# Patient Record
Sex: MALE | URBAN - METROPOLITAN AREA
[De-identification: names, ages, dates, MRNs, and addresses within clinical notes are randomized per-mention and may not be internally consistent; named-entity substitution may affect disease eponyms.]

---
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
## 2024-08-26 ENCOUNTER — HOSPITAL ENCOUNTER (EMERGENCY)
Facility: HOSPITAL | Age: 24
Discharge: HOME | End: 2024-08-26
Attending: EMERGENCY MEDICINE
Payer: COMMERCIAL

## 2024-08-26 VITALS
HEART RATE: 89 BPM | SYSTOLIC BLOOD PRESSURE: 127 MMHG | TEMPERATURE: 98.5 F | OXYGEN SATURATION: 99 % | WEIGHT: 132.72 LBS | DIASTOLIC BLOOD PRESSURE: 63 MMHG | RESPIRATION RATE: 16 BRPM

## 2024-08-26 DIAGNOSIS — L73.9 FOLLICULITIS: Primary | ICD-10-CM

## 2024-08-26 PROCEDURE — 99282 EMERGENCY DEPT VISIT SF MDM: CPT

## 2024-08-26 PROCEDURE — 99284 EMERGENCY DEPT VISIT MOD MDM: CPT | Performed by: EMERGENCY MEDICINE

## 2024-08-26 RX ORDER — CEPHALEXIN 500 MG/1
500 CAPSULE ORAL EVERY 6 HOURS SCHEDULED
Qty: 20 CAPSULE | Refills: 0 | Status: SHIPPED | OUTPATIENT
Start: 2024-08-26 | End: 2024-08-31

## 2024-08-26 NOTE — ED PROVIDER NOTES
History  Chief Complaint   Patient presents with    Cyst     Pt reports cyst below RLQ but above groin beginning two days ago. Denies fevers. Reports pain.      24 YOM presents today with small cyst in his left inguinal region.  Patient reports that it has been there for about 2 days.  Admits to some pain.  No drainage.        None       History reviewed. No pertinent past medical history.    History reviewed. No pertinent surgical history.    History reviewed. No pertinent family history.  I have reviewed and agree with the history as documented.    E-Cigarette/Vaping     E-Cigarette/Vaping Substances     Social History     Tobacco Use    Smoking status: Never   Substance Use Topics    Alcohol use: Yes     Comment: occasionally    Drug use: Yes     Types: Marijuana       Review of Systems   Skin:         Abscess     All other systems reviewed and are negative.      Physical Exam  Physical Exam  Vitals and nursing note reviewed.   Constitutional:       General: He is not in acute distress.     Appearance: Normal appearance. He is well-developed.   HENT:      Head: Normocephalic and atraumatic.   Eyes:      Conjunctiva/sclera: Conjunctivae normal.   Cardiovascular:      Rate and Rhythm: Normal rate.   Pulmonary:      Effort: Pulmonary effort is normal.   Genitourinary:      Musculoskeletal:         General: No swelling. Normal range of motion.      Cervical back: Normal range of motion and neck supple.   Skin:     General: Skin is warm and dry.   Neurological:      Mental Status: He is alert.   Psychiatric:         Mood and Affect: Mood normal.         Behavior: Behavior normal.         Vital Signs  ED Triage Vitals [08/26/24 1256]   Temperature Pulse Respirations Blood Pressure SpO2   98.5 °F (36.9 °C) 89 16 127/63 99 %      Temp Source Heart Rate Source Patient Position - Orthostatic VS BP Location FiO2 (%)   Oral Monitor -- -- --      Pain Score       --           Vitals:    08/26/24 1256   BP: 127/63   Pulse: 89          Visual Acuity      ED Medications  Medications - No data to display    Diagnostic Studies  Results Reviewed       None                   No orders to display              Procedures  Procedures         ED Course                                               Medical Decision Making  Patient does admit to shaving in the area where the pain is.  I do not feel a definitive abscess or drainable area.  Will treat with Keflex.  Discussed warm compresses at home.  Avoid shaving in the area.    I have discussed the plan to discharge pt from ED. The patient was discharged in stable condition.  Patient ambulated off the department.  Extensive return to emergency department precautions were discussed.  Follow up with appropriate providers including primary care physician was discussed.  Patient and/or their  primary decision maker expressed understanding.  Patient remained stable during entire emergency department stay.      Risk  Prescription drug management.                 Disposition  Final diagnoses:   Folliculitis     Time reflects when diagnosis was documented in both MDM as applicable and the Disposition within this note       Time User Action Codes Description Comment    8/26/2024  1:09 PM Marino Banegas Add [L73.9] Folliculitis           ED Disposition       ED Disposition   Discharge    Condition   Stable    Date/Time   Mon Aug 26, 2024  1:09 PM    Comment   Shin Lam discharge to home/self care.                   Follow-up Information    None         Discharge Medication List as of 8/26/2024  1:10 PM        START taking these medications    Details   cephalexin (KEFLEX) 500 mg capsule Take 1 capsule (500 mg total) by mouth every 6 (six) hours for 5 days, Starting Mon 8/26/2024, Until Sat 8/31/2024, Normal             No discharge procedures on file.    PDMP Review       None            ED Provider  Electronically Signed by             Marino Banegas PA-C  08/26/24 9338

## 2024-10-19 ENCOUNTER — HOSPITAL ENCOUNTER (EMERGENCY)
Facility: HOSPITAL | Age: 24
Discharge: HOME | End: 2024-10-19
Attending: EMERGENCY MEDICINE
Payer: COMMERCIAL

## 2024-10-19 VITALS
WEIGHT: 128.09 LBS | SYSTOLIC BLOOD PRESSURE: 128 MMHG | OXYGEN SATURATION: 100 % | RESPIRATION RATE: 16 BRPM | HEART RATE: 87 BPM | DIASTOLIC BLOOD PRESSURE: 78 MMHG | TEMPERATURE: 99.7 F

## 2024-10-19 DIAGNOSIS — K08.89 PAIN, DENTAL: Primary | ICD-10-CM

## 2024-10-19 PROCEDURE — 96372 THER/PROPH/DIAG INJ SC/IM: CPT

## 2024-10-19 PROCEDURE — 99282 EMERGENCY DEPT VISIT SF MDM: CPT

## 2024-10-19 PROCEDURE — 99284 EMERGENCY DEPT VISIT MOD MDM: CPT

## 2024-10-19 RX ORDER — KETOROLAC TROMETHAMINE 30 MG/ML
15 INJECTION, SOLUTION INTRAMUSCULAR; INTRAVENOUS ONCE
Status: COMPLETED | OUTPATIENT
Start: 2024-10-19 | End: 2024-10-19

## 2024-10-19 RX ORDER — DIPHENHYDRAMINE HYDROCHLORIDE AND LIDOCAINE HYDROCHLORIDE AND ALUMINUM HYDROXIDE AND MAGNESIUM HYDRO
10 KIT ONCE
Status: COMPLETED | OUTPATIENT
Start: 2024-10-19 | End: 2024-10-19

## 2024-10-19 RX ADMIN — KETOROLAC TROMETHAMINE 15 MG: 30 INJECTION, SOLUTION INTRAMUSCULAR; INTRAVENOUS at 14:56

## 2024-10-19 RX ADMIN — DIPHENHYDRAMINE HYDROCHLORIDE AND LIDOCAINE HYDROCHLORIDE AND ALUMINUM HYDROXIDE AND MAGNESIUM HYDRO 10 ML: KIT at 14:59

## 2024-10-19 NOTE — ED PROVIDER NOTES
Time reflects when diagnosis was documented in both MDM as applicable and the Disposition within this note       Time User Action Codes Description Comment    10/19/2024  2:42 PM Zi Allen Add [K08.89] Pain, dental           ED Disposition       ED Disposition   Discharge    Condition   Stable    Date/Time   Sat Oct 19, 2024  2:48 PM    Comment   Shin Lam discharge to home/self care.                   Assessment & Plan       Medical Decision Making  24-year-old male presents to the ED for evaluation of ongoing dental pain X approximately 3 weeks.  Patient afebrile normal vital signs in the ED, well-appearing on exam.  On exam, no signs of trismus, drooling, no signs of any obviously drainable abscess in patient's mouth, no signs of airway swelling or compromise.  Patient given IM Toradol and Magic mouthwash in ED.  Given prescription for Augmentin.  Ambulatory referral to [place] [facility] placed.  I did emphasize the importance of follow-up with professional dental services for definitive care.  ED return precautions discussed with patient.  Patient verbalized understanding and agreement with plan.    Risk  Prescription drug management.             Medications       ED Risk Strat Scores                           SBIRT 22yo+      Flowsheet Row Most Recent Value   Initial Alcohol Screen: US AUDIT-C     1. How often do you have a drink containing alcohol? 0 Filed at: 10/19/2024 1506   2. How many drinks containing alcohol do you have on a typical day you are drinking?  0 Filed at: 10/19/2024 1506   3a. Male UNDER 65: How often do you have five or more drinks on one occasion? 0 Filed at: 10/19/2024 1506   3b. FEMALE Any Age, or MALE 65+: How often do you have 4 or more drinks on one occassion? 0 Filed at: 10/19/2024 1506   Audit-C Score 0 Filed at: 10/19/2024 1506   NYDIA: How many times in the past year have you...    Used an illegal drug or used a prescription medication for non-medical reasons? Never  Filed at: 10/19/2024 1506                            History of Present Illness       Chief Complaint   Patient presents with    Dental Pain     Pt c/o left loer dental pain x3 weeks reports has to get tooth extracted. Denies fevers       Past Medical History:   Diagnosis Date    Complete blindness of right eye       History reviewed. No pertinent surgical history.   History reviewed. No pertinent family history.   Social History     Tobacco Use    Smoking status: Never   Substance Use Topics    Alcohol use: Yes     Comment: occasionally    Drug use: Yes     Types: Marijuana      E-Cigarette/Vaping      E-Cigarette/Vaping Substances      I have reviewed and agree with the history as documented.     This is a 24-year-old male presents ED for evaluation of dental pain X approximately 3 weeks.  Patient reports left lower dental pain that has been ongoing.  Denies any injury to his mouth or jaw.  States pain does radiate to his left ear, otherwise nonradiating.  Denies any trismus, drooling.  Does report mildly increased pain with chewing but states he is not having issues tolerating p.o.  He reports he has been having issues following up with a dentist.  Reports that he has tried multiple dentists and either they do not return his calls or they do not accept his insurance.  He denies any other acute concerns or complaints at this time including fevers, chills, headaches, vision changes, neck pain, chest pain, SOB, abdominal pain, NVD, dysuria.  States he last took Tylenol yesterday for the pain.      Dental Pain  Associated symptoms: no fever, no headaches and no neck pain        Review of Systems   Constitutional:  Negative for chills and fever.   HENT:  Positive for dental problem. Negative for trouble swallowing.    Eyes:  Negative for photophobia and visual disturbance.   Respiratory:  Negative for cough and shortness of breath.    Cardiovascular:  Negative for chest pain and palpitations.   Gastrointestinal:   Negative for abdominal pain, diarrhea, nausea and vomiting.   Genitourinary:  Negative for difficulty urinating, dysuria and hematuria.   Musculoskeletal:  Negative for neck pain and neck stiffness.   Neurological:  Negative for dizziness, syncope, light-headedness and headaches.           Objective       ED Triage Vitals   Temperature Pulse Blood Pressure Respirations SpO2 Patient Position - Orthostatic VS   10/19/24 1326 10/19/24 1328 10/19/24 1326 10/19/24 1326 10/19/24 1326 10/19/24 1326   99.7 °F (37.6 °C) 87 128/78 16 100 % Sitting      Temp Source Heart Rate Source BP Location FiO2 (%) Pain Score    10/19/24 1326 10/19/24 1326 10/19/24 1326 -- 10/19/24 1456    Oral Monitor Right arm  10 - Worst Possible Pain      Vitals      Date and Time Temp Pulse SpO2 Resp BP Pain Score FACES Pain Rating User   10/19/24 1456 -- -- -- -- -- 10 - Worst Possible Pain -- ML   10/19/24 1328 -- 87 -- -- -- -- -- DIC   10/19/24 1326 99.7 °F (37.6 °C) -- 100 % 16 128/78 -- -- DIC            Physical Exam  Vitals and nursing note reviewed.   Constitutional:       General: He is not in acute distress.     Appearance: Normal appearance. He is well-developed. He is not ill-appearing, toxic-appearing or diaphoretic.   HENT:      Head: Normocephalic and atraumatic.      Right Ear: Tympanic membrane, ear canal and external ear normal.      Left Ear: Tympanic membrane, ear canal and external ear normal.      Mouth/Throat:        Comments: No signs of bleeding or drainage.  No signs of any obviously drainable abscess.  Uvula sits midline.  No signs of submental or submandibular swelling.  No signs of tonsillar swelling or tonsillar exudates.  No signs of acute airway swelling or compromise.  Eyes:      Conjunctiva/sclera: Conjunctivae normal.   Cardiovascular:      Rate and Rhythm: Normal rate and regular rhythm.      Heart sounds: No murmur heard.  Pulmonary:      Effort: Pulmonary effort is normal. No respiratory distress.      Breath  sounds: Normal breath sounds.   Abdominal:      General: There is no distension.      Palpations: Abdomen is soft.      Tenderness: There is no abdominal tenderness. There is no right CVA tenderness, left CVA tenderness or guarding.   Musculoskeletal:         General: No swelling.      Cervical back: Normal range of motion and neck supple. No rigidity.   Skin:     General: Skin is warm and dry.      Capillary Refill: Capillary refill takes less than 2 seconds.   Neurological:      General: No focal deficit present.      Mental Status: He is alert and oriented to person, place, and time.   Psychiatric:         Mood and Affect: Mood normal.         Results Reviewed       None            No orders to display       Procedures    ED Medication and Procedure Management   None     Discharge Medication List as of 10/19/2024  2:48 PM        START taking these medications    Details   amoxicillin-clavulanate (AUGMENTIN) 875-125 mg per tablet Take 1 tablet by mouth every 12 (twelve) hours for 7 days, Starting Sat 10/19/2024, Until Sat 10/26/2024, Normal             ED SEPSIS DOCUMENTATION   Time reflects when diagnosis was documented in both MDM as applicable and the Disposition within this note       Time User Action Codes Description Comment    10/19/2024  2:42 PM Zi Allen Add [K08.89] Pain, dental                  Zi Allen PA-C  10/19/24 3393

## 2024-10-24 ENCOUNTER — OFFICE VISIT (OUTPATIENT)
Dept: DENTISTRY | Facility: CLINIC | Age: 24
End: 2024-10-24

## 2024-10-24 VITALS — SYSTOLIC BLOOD PRESSURE: 114 MMHG | HEART RATE: 67 BPM | TEMPERATURE: 98.7 F | DIASTOLIC BLOOD PRESSURE: 69 MMHG

## 2024-10-24 DIAGNOSIS — K04.7 DENTAL INFECTION: Primary | ICD-10-CM

## 2024-10-24 PROCEDURE — D0330 PANORAMIC RADIOGRAPHIC IMAGE: HCPCS

## 2024-10-24 PROCEDURE — D0220 INTRAORAL - PERIAPICAL FIRST RADIOGRAPHIC IMAGE: HCPCS

## 2024-10-24 PROCEDURE — D0140 LIMITED ORAL EVALUATION - PROBLEM FOCUSED: HCPCS

## 2024-10-24 NOTE — DENTAL PROCEDURE DETAILS
"Limited Exam    Shin Lam 24 y.o. male presents with self to [facility] for Limited exam  PMH reviewed, no changes, ASA I.    Chief complaint:  \"I went to the ER because I was in pain and its keeping me up at night. They gave me antibiotics, I am on day 5.\"    Consent:  Discussed that limited exam focuses on problem area, and same day tx is not guaranteed.  Patient explained to if they wish to have anything else evaluated, they need to return to the practice at which they are a patient of record or schedule a comprehensive exam afterwards.  Patient understands and consent was given by self via verbal consent.    Subjective history:    Onset: 1 week ago.   Provocation: Cold, Hot, Biting, Chewing.   Quality: Sharp, Shooting, \"Electric-shock\".   Region: UL, LL.   Severity: 10/10.   Timing: constant, keeps patient up at night.    Objective clinical findings:   Oral cancer screening: normal.   Extraoral exam: no remarkable findings.  Intraoral exam: no remarkable findings.     Radiographs: PAN and Select PA(s) - #17,18,19 .     Assessment:  Caries on #16  Non-restorable caries on #19 and 30  Caries on #18-MO    Plan:   Limited intra oral and extra oral exam completed. Explained to patient he does need to get 1,16,19,and 30 removed. In addition, informed patient he has caries on #18 and recommend he return for comp exam to determine full treatment plan.    Referral(s): [facility] for 1,16,19,30 .  Rx: None.  Comprehensive care disposition: Patient denies being a patient of record anywhere and was encouraged to establish comprehensive dental care somewhere, whether it be at one of the  dental clinics or another practice of choice.    Patient dismissed ambulatory and alert.    NV: comp exam.    Attending: Dr. Eid checked radiographs .   "

## 2024-10-30 ENCOUNTER — TELEPHONE (OUTPATIENT)
Dept: DENTISTRY | Facility: CLINIC | Age: 24
End: 2024-10-30

## 2024-11-25 ENCOUNTER — HOSPITAL ENCOUNTER (EMERGENCY)
Facility: HOSPITAL | Age: 24
Discharge: HOME | End: 2024-11-25
Attending: EMERGENCY MEDICINE
Payer: COMMERCIAL

## 2024-11-25 VITALS
HEART RATE: 90 BPM | RESPIRATION RATE: 18 BRPM | WEIGHT: 127.87 LBS | TEMPERATURE: 98.5 F | SYSTOLIC BLOOD PRESSURE: 141 MMHG | DIASTOLIC BLOOD PRESSURE: 71 MMHG | OXYGEN SATURATION: 100 %

## 2024-11-25 DIAGNOSIS — K08.89 DENTALGIA: ICD-10-CM

## 2024-11-25 DIAGNOSIS — K04.7 DENTAL ABSCESS: Primary | ICD-10-CM

## 2024-11-25 PROCEDURE — 99284 EMERGENCY DEPT VISIT MOD MDM: CPT

## 2024-11-25 PROCEDURE — 96372 THER/PROPH/DIAG INJ SC/IM: CPT

## 2024-11-25 PROCEDURE — 99282 EMERGENCY DEPT VISIT SF MDM: CPT

## 2024-11-25 RX ORDER — KETOROLAC TROMETHAMINE 30 MG/ML
15 INJECTION, SOLUTION INTRAMUSCULAR; INTRAVENOUS ONCE
Status: COMPLETED | OUTPATIENT
Start: 2024-11-25 | End: 2024-11-25

## 2024-11-25 RX ORDER — IBUPROFEN 600 MG/1
600 TABLET, FILM COATED ORAL EVERY 6 HOURS PRN
Qty: 30 TABLET | Refills: 0 | Status: SHIPPED | OUTPATIENT
Start: 2024-11-25

## 2024-11-25 RX ADMIN — AMOXICILLIN AND CLAVULANATE POTASSIUM 1 TABLET: 875; 125 TABLET, FILM COATED ORAL at 19:56

## 2024-11-25 RX ADMIN — KETOROLAC TROMETHAMINE 15 MG: 30 INJECTION, SOLUTION INTRAMUSCULAR; INTRAVENOUS at 19:56

## 2024-11-25 NOTE — Clinical Note
Shin Lam was seen and treated in our emergency department on 11/25/2024.    No restrictions            Diagnosis:     Shin  may return to work on return date.    He may return on this date: 11/27/2024         If you have any questions or concerns, please don't hesitate to call.      Kezia Youssef RN    ______________________________           _______________          _______________  Hospital Representative                              Date                                Time

## 2024-11-25 NOTE — Clinical Note
Shin Lam was seen and treated in our emergency department on 11/25/2024.    No restrictions            Diagnosis:     Shin  may return to work on return date.    He may return on this date: 11/26/2024         If you have any questions or concerns, please don't hesitate to call.      Kezia Youssef RN    ______________________________           _______________          _______________  Hospital Representative                              Date                                Time

## 2024-11-26 ENCOUNTER — OFFICE VISIT (OUTPATIENT)
Dept: DENTISTRY | Facility: CLINIC | Age: 24
End: 2024-11-26

## 2024-11-26 VITALS — HEART RATE: 89 BPM | SYSTOLIC BLOOD PRESSURE: 108 MMHG | DIASTOLIC BLOOD PRESSURE: 72 MMHG | TEMPERATURE: 98.9 F

## 2024-11-26 DIAGNOSIS — K08.89 PAIN, DENTAL: Primary | ICD-10-CM

## 2024-11-26 DIAGNOSIS — K04.7 DENTAL ABSCESS: ICD-10-CM

## 2024-11-26 PROBLEM — F90.2 ADHD (ATTENTION DEFICIT HYPERACTIVITY DISORDER), COMBINED TYPE: Status: ACTIVE | Noted: 2020-02-07

## 2024-11-26 PROBLEM — Z72.821 INADEQUATE SLEEP HYGIENE: Status: ACTIVE | Noted: 2020-02-07

## 2024-11-26 PROBLEM — G47.33 MILD OBSTRUCTIVE SLEEP APNEA: Status: ACTIVE | Noted: 2020-02-07

## 2024-11-26 PROBLEM — G47.01 INSOMNIA DUE TO MEDICAL CONDITION: Status: ACTIVE | Noted: 2020-02-07

## 2024-11-26 PROBLEM — G47.23 IRREGULAR SLEEP-WAKE RHYTHM, NONORGANIC ORIGIN: Status: ACTIVE | Noted: 2020-02-07

## 2024-11-26 PROBLEM — F12.90 MARIJUANA USE: Status: ACTIVE | Noted: 2022-04-22

## 2024-11-26 PROCEDURE — D0140 LIMITED ORAL EVALUATION - PROBLEM FOCUSED: HCPCS | Performed by: DENTIST

## 2024-11-26 PROCEDURE — D0220 INTRAORAL - PERIAPICAL FIRST RADIOGRAPHIC IMAGE: HCPCS | Performed by: DENTIST

## 2024-11-26 NOTE — ED PROVIDER NOTES
Time reflects when diagnosis was documented in both MDM as applicable and the Disposition within this note       Time User Action Codes Description Comment    11/25/2024  7:46 PM Masoud Randhawa Add [K04.7] Dental abscess     11/25/2024  7:46 PM Masoud Randhawa [K08.89] Dentalgia           ED Disposition       ED Disposition   Discharge    Condition   Stable    Date/Time   Mon Nov 25, 2024  7:46 PM    Comment   Shin Lam discharge to home/self care.                   Assessment & Plan       Medical Decision Making  24-year-old male presents for evaluation of dental pain and swelling for 3 days.  Exam: No gross facial swelling, normal phonation, tolerating secretions, in NAD.  Mild gingival swelling surrounding right upper frontal tooth, tender to palpation of tooth.  No maxillary masses felt on palpation.    Presentation most likely consistent with dental abscess.  Will start on Augmentin.  Will give Toradol for pain relief.  Recommend follow-up with dentistry tomorrow as scheduled and return to the ED if symptoms continue to worsen i.e. worsening facial swelling.  Patient expresses understanding of the condition, treatment plan, follow-up instructions, and return precautions.      Risk  Prescription drug management.             Medications   ketorolac (TORADOL) injection 15 mg (15 mg Intramuscular Given 11/25/24 1956)   amoxicillin-clavulanate (AUGMENTIN) 875-125 mg per tablet 1 tablet (1 tablet Oral Given 11/25/24 1956)       ED Risk Strat Scores                                               History of Present Illness       Chief Complaint   Patient presents with    Dental Pain     States front dental pain for 3 days. States pain has gotten worse and has R sided swelling.       Past Medical History:   Diagnosis Date    Complete blindness of right eye       History reviewed. No pertinent surgical history.   History reviewed. No pertinent family history.   Social History     Tobacco Use    Smoking status: Never    Vaping Use    Vaping status: Every Day   Substance Use Topics    Alcohol use: Yes     Comment: occasionally    Drug use: Yes     Types: Marijuana      E-Cigarette/Vaping    E-Cigarette Use Current Every Day User       E-Cigarette/Vaping Substances      I have reviewed and agree with the history as documented.     24-year-old male presents for evaluation of dental pain and swelling for the last 3 days.  Has been icing the affected area, taking Tylenol transiently.  Is having significant pain with chewing/biting down, localized primarily to his upper right frontal teeth.  No fevers, chills, vomiting.  Tolerating secretions.  Has a dentistry follow-up tomorrow morning.        Review of Systems   Constitutional:  Negative for chills and fever.   HENT:  Positive for dental problem and facial swelling. Negative for ear pain and sore throat.    Eyes:  Negative for pain and visual disturbance.   Respiratory:  Negative for cough and shortness of breath.    Cardiovascular:  Negative for chest pain and palpitations.   Gastrointestinal:  Negative for abdominal pain and vomiting.   Genitourinary:  Negative for dysuria and hematuria.   Musculoskeletal:  Negative for arthralgias and back pain.   Skin:  Negative for color change and rash.   Neurological:  Negative for seizures and syncope.   All other systems reviewed and are negative.          Objective       ED Triage Vitals [11/25/24 1918]   Temperature Pulse Blood Pressure Respirations SpO2 Patient Position - Orthostatic VS   98.5 °F (36.9 °C) 90 141/71 18 100 % Sitting      Temp Source Heart Rate Source BP Location FiO2 (%) Pain Score    Oral Monitor Right arm -- 10 - Worst Possible Pain      Vitals      Date and Time Temp Pulse SpO2 Resp BP Pain Score FACES Pain Rating User   11/25/24 1956 -- -- -- -- -- 9 --    11/25/24 1918 98.5 °F (36.9 °C) 90 100 % 18 141/71 10 - Worst Possible Pain -- RC            Physical Exam  Vitals and nursing note reviewed.   Constitutional:        General: He is not in acute distress.     Appearance: He is well-developed.   HENT:      Head: Normocephalic and atraumatic.      Mouth/Throat:      Dentition: Dental tenderness and gingival swelling present.     Eyes:      Conjunctiva/sclera: Conjunctivae normal.   Cardiovascular:      Rate and Rhythm: Normal rate and regular rhythm.      Heart sounds: No murmur heard.  Pulmonary:      Effort: Pulmonary effort is normal. No respiratory distress.      Breath sounds: Normal breath sounds.   Abdominal:      Palpations: Abdomen is soft.      Tenderness: There is no abdominal tenderness.   Musculoskeletal:         General: No swelling.      Cervical back: Neck supple.   Skin:     General: Skin is warm and dry.      Capillary Refill: Capillary refill takes less than 2 seconds.   Neurological:      Mental Status: He is alert.   Psychiatric:         Mood and Affect: Mood normal.         Results Reviewed       None            No orders to display       Procedures    ED Medication and Procedure Management   None     Discharge Medication List as of 11/25/2024  7:48 PM        START taking these medications    Details   amoxicillin-clavulanate (AUGMENTIN) 875-125 mg per tablet Take 1 tablet by mouth every 12 (twelve) hours for 10 days, Starting Mon 11/25/2024, Until Thu 12/5/2024, Normal      ibuprofen (MOTRIN) 600 mg tablet Take 1 tablet (600 mg total) by mouth every 6 (six) hours as needed for mild pain, moderate pain, fever or headaches, Starting Mon 11/25/2024, Normal           No discharge procedures on file.  ED SEPSIS DOCUMENTATION   Time reflects when diagnosis was documented in both MDM as applicable and the Disposition within this note       Time User Action Codes Description Comment    11/25/2024  7:46 PM Masoud Randhawa [K04.7] Dental abscess     11/25/2024  7:46 PM Masoud Randhawa [K08.89] Dentalgia                  Masoud Randhawa PA-C  11/26/24 2113

## 2024-11-26 NOTE — DENTAL PROCEDURE DETAILS
"Patient due for Comp Exam  Last BWs taken N/A  RMH, NSC, ASA 1 - Normal health patient.  Patient reports pain level of 10.    Patient presents to [facility] for Limited exam with CC of \"Pain and swelling from upper right\".      EOE shows notable diffuse swelling of upper right anterior maxilla.  Very sensitive to touch and palpation.  IOE shows no intraoral swelling or sinus tracts.  Tooth #8 has existing crown.  Tooth #8 very sensitive to percussion.  Upper right anterior buccal vestibule very sensitive to palpation.    1 PA taken showing PARL at apex of #8.  Discussed with patient this tooth will need RCT to eliminate source of infection.  Due to presence of crown, RCT must be completed by endodontist.  Referral provided.  Instructed patient to continue taking previously prescribed antibiotics.  Advised patient to contact us if pain and swelling do not subside after taking antibiotics for a few days, patient understands.    NV: Comprehensive Exam  "

## 2024-11-26 NOTE — DISCHARGE INSTRUCTIONS
Please start taking Augmentin 1 tablet by mouth, 2 times daily, for 10 days.  This should help with your dental infection.  You can alternate ibuprofen and Tylenol every 3 hours as needed for pain relief.  Follow-up with your dentistry appointment tomorrow as scheduled.

## 2024-12-03 ENCOUNTER — TELEPHONE (OUTPATIENT)
Dept: DENTISTRY | Facility: CLINIC | Age: 24
End: 2024-12-03

## 2024-12-11 ENCOUNTER — OFFICE VISIT (OUTPATIENT)
Dept: DENTISTRY | Facility: CLINIC | Age: 24
End: 2024-12-11

## 2024-12-11 VITALS — HEART RATE: 62 BPM | DIASTOLIC BLOOD PRESSURE: 66 MMHG | TEMPERATURE: 97.5 F | SYSTOLIC BLOOD PRESSURE: 100 MMHG

## 2024-12-11 DIAGNOSIS — K08.109 TEETH MISSING: ICD-10-CM

## 2024-12-11 DIAGNOSIS — K02.9 CARIES: ICD-10-CM

## 2024-12-11 DIAGNOSIS — K05.10 GINGIVITIS: ICD-10-CM

## 2024-12-11 DIAGNOSIS — Z91.843 RISK FOR DENTAL CARIES, HIGH: Primary | ICD-10-CM

## 2024-12-11 PROCEDURE — D0150 COMPREHENSIVE ORAL EVALUATION - NEW OR ESTABLISHED PATIENT: HCPCS | Performed by: DENTIST

## 2024-12-11 PROCEDURE — D0274 BITEWINGS - 4 RADIOGRAPHIC IMAGES: HCPCS | Performed by: DENTIST

## 2024-12-11 RX ORDER — SODIUM FLUORIDE 5 MG/ML
PASTE, DENTIFRICE DENTAL
Qty: 100 G | Refills: 2 | Status: SHIPPED | OUTPATIENT
Start: 2024-12-11

## 2024-12-11 NOTE — PROGRESS NOTES
"Comprehensive Exam and 4BW Radiographs     Shin Lam 24 y.o. male presents with self to [facility] for comprehensive exam.  PMH reviewed, no changes, ASA II. Significant medical history: see list. Significant allergies: denies. Significant medications: see list.  Pain level:  0/10  Chief complaint: \"teeth cleaning and cavities\".   Dental History: patient was seen by other provider for dental emergency on 10/24/2024 and referred to [facility] for extractions of teeth #1,16,19,30. Patient was seen by other provider on 11/26/2024 for another dental emergency and referred to endo for RCT of tooth #8 through existing crown. Patient reported he has teeth extracted by [facility] and is scheduled with endo for tooth #8.   Consent:  Reviewed procedures involved with comprehensive exam including radiographs, oral exam, and periodontal probing.   Patient understands and consent was given by self via verbal consent.  Radiographs: Current PAN and selected PA taken by other providers on 10/24/2024. 4BW radiographs are taken today. .  Oral cancer screening: normal.  Extraoral exam: no remarkable findings.  Intraoral exam: significantly sized caries, gingival inflammation.  Periodontal exam: see periodontal charting provided today.   Hygiene - Fair.  Plaque - Moderate.  Horizontal bone loss -Localized.   Vertical bone loss - None.  Subgingival calculus - Localized.  BOP - Localized.  Mobility - None.  Furcation involvements - None.  Occlusal trauma - None.  Periodontal Stage: Moderate gingivitis.  Periodontal Grade: A.  Periodontal Plan: Prophy.  Caries exam:   Caries detected: teeth #13 DO, #14 MO, #15 Occlusal, #18 MO, #20 Occlusal, #31 MO. Recommended restorations. Watch teeth #4 MD, #5 D, #12 D, #`20 M, #21 D, #29 M.   Occlusal assessment:  VDO / restorative space - Normal.  AP Classification -  Right: Canine Class I; Molar Class I.  Left: Canine Class I; Molar Class I.  Other remarkable findings:  1- Patient was referred by Dr. Rausch to endo " for RCT of tooth #8 PARL through existing crown (see note) on 11/26/2024.  2- Missing teeth #19 and #30. Discussed possible implant VS RPD. Patient is interested in implants.    Tx plan:  1- Prophy.  2- Restorations teeth # teeth #13 DO, #14 MO, #15 Occlusal, #18 MO, #20 Occlusal, #31 MO.  3- Follow up with endodontist of tooth #8 per referral by Dr. Rausch.   4- Implant consultation at [facility] for #19 and #30.   Recommended recall schedule: 6 months.  Preventive Care: Stressed for teeth brushing, flossing and use of mouth rinse. Prescribed Prevident tooth paste due to high caries risk assessment.   POI is given. Reviewed oral hygiene and aayush for recall visits.   Patient dismissed ambulatory and alert.  NV1: Prophy.  NV2: Restorative Care.  NV3: at [facility] implant consultation #19 and #30.

## 2025-04-19 ENCOUNTER — APPOINTMENT (EMERGENCY)
Dept: RADIOLOGY | Facility: HOSPITAL | Age: 25
End: 2025-04-19
Payer: COMMERCIAL

## 2025-04-19 ENCOUNTER — HOSPITAL ENCOUNTER (EMERGENCY)
Facility: HOSPITAL | Age: 25
Discharge: HOME | End: 2025-04-19
Attending: EMERGENCY MEDICINE
Payer: COMMERCIAL

## 2025-04-19 VITALS
OXYGEN SATURATION: 100 % | WEIGHT: 135.36 LBS | SYSTOLIC BLOOD PRESSURE: 117 MMHG | DIASTOLIC BLOOD PRESSURE: 57 MMHG | TEMPERATURE: 97.8 F | RESPIRATION RATE: 18 BRPM | HEART RATE: 83 BPM

## 2025-04-19 DIAGNOSIS — M25.511 RIGHT SHOULDER PAIN: Primary | ICD-10-CM

## 2025-04-19 PROCEDURE — 99284 EMERGENCY DEPT VISIT MOD MDM: CPT | Performed by: EMERGENCY MEDICINE

## 2025-04-19 PROCEDURE — 99283 EMERGENCY DEPT VISIT LOW MDM: CPT

## 2025-04-19 PROCEDURE — 73030 X-RAY EXAM OF SHOULDER: CPT

## 2025-04-19 NOTE — Clinical Note
Shin Lam was seen and treated in our emergency department on 4/19/2025.                Diagnosis:     Shin  may return to work on return date.    He may return on this date: 04/24/2025         If you have any questions or concerns, please don't hesitate to call.      Karthikeyan Gregory MD    ______________________________           _______________          _______________  Hospital Representative                              Date                                Time

## 2025-04-19 NOTE — ED PROVIDER NOTES
ED Disposition       None          Assessment & Plan   {Hyperlinks  Risk Stratification - NIHSS - HEART SCORE - Fill out sepsis note and make sure you call [id number] if severe or septic shock:[id number]}    Medical Decision Making  Amount and/or Complexity of Data Reviewed  Radiology: ordered.             Medications - No data to display    ED Risk Strat Scores                    No data recorded                            History of Present Illness   {Hyperlinks  History (Med, Surg, Fam, Social) - Current Medications - Allergies  :[id number]}    Chief Complaint   Patient presents with   • Shoulder Pain     Pt c/o right shoulder pain for about the last hour after injuring it while playing basketball.        Past Medical History:   Diagnosis Date   • Complete blindness of right eye       History reviewed. No pertinent surgical history.   History reviewed. No pertinent family history.   Social History     Tobacco Use   • Smoking status: Never   Vaping Use   • Vaping status: Every Day   Substance Use Topics   • Alcohol use: Yes     Comment: occasionally   • Drug use: Yes     Types: Marijuana      E-Cigarette/Vaping   • E-Cigarette Use Current Every Day User       E-Cigarette/Vaping Substances      I have reviewed and agree with the history as documented.     HPI    Review of Systems        Objective   {Hyperlinks  Historical Vitals - Historical Labs - Chart Review/Microbiology - Last Echo - Code Status  :[id number]}    ED Triage Vitals [04/19/25 1752]   Temperature Pulse Blood Pressure Respirations SpO2 Patient Position - Orthostatic VS   97.8 °F (36.6 °C) 83 117/57 18 100 % --      Temp Source Heart Rate Source BP Location FiO2 (%) Pain Score    Oral Monitor -- -- 8      Vitals      Date and Time Temp Pulse SpO2 Resp BP Pain Score FACES Pain Rating User   04/19/25 1752 97.8 °F (36.6 °C) 83 100 % 18 117/57 8 -- AND            Physical Exam    Results Reviewed       None            XR shoulder 2+ views RIGHT    (Results  Pending)       Procedures    ED Medication and Procedure Management   Prior to Admission Medications   Prescriptions Last Dose Informant Patient Reported? Taking?   SODIUM FLUORIDE, DENTAL GEL, 1.1 % CREA   No No   Sig: Brush teeth one time before bedtime, don't rinse but spit out excess.   ibuprofen (MOTRIN) 600 mg tablet   No No   Sig: Take 1 tablet (600 mg total) by mouth every 6 (six) hours as needed for mild pain, moderate pain, fever or headaches      Facility-Administered Medications: None     Patient's Medications   Discharge Prescriptions    No medications on file     No discharge procedures on file.  ED SEPSIS DOCUMENTATION            Abdominal:      Palpations: Abdomen is soft.      Tenderness: There is no abdominal tenderness.   Musculoskeletal:         General: No swelling or deformity.      Cervical back: Neck supple.      Comments: No gross deformity.  Normal passive range of motion right shoulder, does have some pain with active range of motion against resistance especially in abduction and external rotation.  No bony tenderness over the clavicle or humerus.  There is perhaps mild tenderness over the AC joint.   Skin:     General: Skin is warm and dry.      Capillary Refill: Capillary refill takes less than 2 seconds.   Neurological:      General: No focal deficit present.      Mental Status: He is alert and oriented to person, place, and time.   Psychiatric:         Mood and Affect: Mood normal.         Results Reviewed       None            XR shoulder 2+ views RIGHT   Final Interpretation by Durga Hernandez MD (04/21 0804)      No acute osseous abnormality.         Computerized Assisted Algorithm (CAA) may have been used to analyze all applicable images.         Workstation performed: [id number]             Procedures    ED Medication and Procedure Management   Prior to Admission Medications   Prescriptions Last Dose Informant Patient Reported? Taking?   SODIUM FLUORIDE, DENTAL GEL, 1.1 % CREA   No No   Sig: Brush teeth one time before bedtime, don't rinse but spit out excess.   ibuprofen (MOTRIN) 600 mg tablet   No No   Sig: Take 1 tablet (600 mg total) by mouth every 6 (six) hours as needed for mild pain, moderate pain, fever or headaches      Facility-Administered Medications: None     Discharge Medication List as of 4/19/2025  7:20 PM        CONTINUE these medications which have NOT CHANGED    Details   ibuprofen (MOTRIN) 600 mg tablet Take 1 tablet (600 mg total) by mouth every 6 (six) hours as needed for mild pain, moderate pain, fever or headaches, Starting Mon 11/25/2024, Normal      SODIUM FLUORIDE, DENTAL GEL, 1.1 % CREA Brush  teeth one time before bedtime, don't rinse but spit out excess., Normal             ED SEPSIS DOCUMENTATION   Time reflects when diagnosis was documented in both MDM as applicable and the Disposition within this note       Time User Action Codes Description Comment    4/19/2025  7:19 PM Karthikeyan Gregory Add [M25.511] Right shoulder pain                  Karthikeyan Gregory MD  05/09/25 1009

## 2025-05-17 ENCOUNTER — OFFICE VISIT (OUTPATIENT)
Dept: URGENT CARE | Facility: MEDICAL CENTER | Age: 25
End: 2025-05-17
Payer: COMMERCIAL

## 2025-05-17 VITALS
SYSTOLIC BLOOD PRESSURE: 130 MMHG | OXYGEN SATURATION: 99 % | HEART RATE: 87 BPM | DIASTOLIC BLOOD PRESSURE: 58 MMHG | TEMPERATURE: 98.6 F | RESPIRATION RATE: 18 BRPM

## 2025-05-17 DIAGNOSIS — J06.9 UPPER RESPIRATORY TRACT INFECTION, UNSPECIFIED TYPE: Primary | ICD-10-CM

## 2025-05-17 PROCEDURE — 99213 OFFICE O/P EST LOW 20 MIN: CPT | Performed by: FAMILY MEDICINE

## 2025-05-17 PROCEDURE — S9088 SERVICES PROVIDED IN URGENT: HCPCS | Performed by: FAMILY MEDICINE

## 2025-05-18 ENCOUNTER — HOSPITAL ENCOUNTER (EMERGENCY)
Facility: HOSPITAL | Age: 25
Discharge: HOME | End: 2025-05-18
Attending: INTERNAL MEDICINE
Payer: OTHER MISCELLANEOUS

## 2025-05-18 ENCOUNTER — APPOINTMENT (EMERGENCY)
Dept: RADIOLOGY | Facility: HOSPITAL | Age: 25
End: 2025-05-18
Payer: OTHER MISCELLANEOUS

## 2025-05-18 VITALS
HEIGHT: 63 IN | HEART RATE: 82 BPM | TEMPERATURE: 98.2 F | WEIGHT: 132.72 LBS | DIASTOLIC BLOOD PRESSURE: 57 MMHG | RESPIRATION RATE: 16 BRPM | OXYGEN SATURATION: 99 % | SYSTOLIC BLOOD PRESSURE: 121 MMHG | BODY MASS INDEX: 23.52 KG/M2

## 2025-05-18 DIAGNOSIS — M25.511 RIGHT SHOULDER PAIN: Primary | ICD-10-CM

## 2025-05-18 PROCEDURE — 99283 EMERGENCY DEPT VISIT LOW MDM: CPT

## 2025-05-18 PROCEDURE — 73030 X-RAY EXAM OF SHOULDER: CPT

## 2025-05-18 PROCEDURE — 99284 EMERGENCY DEPT VISIT MOD MDM: CPT | Performed by: PHYSICIAN ASSISTANT

## 2025-05-18 NOTE — PROGRESS NOTES
[facility]        NAME: Shin Lam is a 24 y.o. male  : 2000    MRN: [id number]  DATE: May 17, 2025  TIME: 8:17 PM    Assessment and Plan   Upper respiratory tract infection, unspecified type [J06.9]  1. Upper respiratory tract infection, unspecified type              Patient Instructions       Follow up with PCP in 3-5 days.  Proceed to  ER if symptoms worsen.    If tests have been performed at [facility], our office will contact you with results if changes need to be made to the care plan discussed with you at the visit.  [facility]    Chief Complaint     Chief Complaint   Patient presents with    Earache     Patient c/o right ear pain that started last night.          History of Present Illness       24-year-old male with right ear pain since last evening.  He requests a work note for missing work today.    Earache         Review of Systems   Review of Systems   Constitutional: Negative.    HENT:  Positive for ear pain.    Eyes: Negative.    Respiratory: Negative.     Cardiovascular: Negative.    Gastrointestinal: Negative.    Genitourinary: Negative.    Skin: Negative.    Allergic/Immunologic: Negative.    Neurological: Negative.    Hematological: Negative.    Psychiatric/Behavioral: Negative.           Current Medications     Current Medications[1]    Current Allergies     Allergies as of 2025    (No Known Allergies)            The following portions of the patient's history were reviewed and updated as appropriate: allergies, current medications, past family history, past medical history, past social history, past surgical history and problem list.     Past Medical History:   Diagnosis Date    Complete blindness of right eye        No past surgical history on file.    No family history on file.      Medications have been verified.        Objective   /58   Pulse 87   Temp 98.6 °F (37 °C)   Resp 18   SpO2 99%   No LMP for male  patient.       Physical Exam     Physical Exam  Constitutional:       Appearance: He is well-developed.   HENT:      Head: Normocephalic.      Right Ear: Tenderness present. No middle ear effusion. Tympanic membrane is not injected, erythematous or bulging.      Left Ear:  No middle ear effusion. Tympanic membrane is not injected, erythematous or bulging.     Eyes:      Pupils: Pupils are equal, round, and reactive to light.     Pulmonary:      Effort: Pulmonary effort is normal.     Musculoskeletal:         General: Normal range of motion.      Cervical back: Normal range of motion.     Skin:     General: Skin is warm.     Neurological:      Mental Status: He is alert.                        [1]   Current Outpatient Medications:     ibuprofen (MOTRIN) 600 mg tablet, Take 1 tablet (600 mg total) by mouth every 6 (six) hours as needed for mild pain, moderate pain, fever or headaches, Disp: 30 tablet, Rfl: 0    SODIUM FLUORIDE, DENTAL GEL, 1.1 % CREA, Brush teeth one time before bedtime, don't rinse but spit out excess., Disp: 100 g, Rfl: 2

## 2025-05-18 NOTE — ED PROVIDER NOTES
Time reflects when diagnosis was documented in both MDM as applicable and the Disposition within this note       Time User Action Codes Description Comment    5/18/2025  5:26 PM Evonne Caldwell Add [M25.511] Right shoulder pain           ED Disposition       ED Disposition   Discharge    Condition   Stable    Date/Time   Sun May 18, 2025  5:26 PM    Comment   Shin Lam discharge to home/self care.                   Assessment & Plan       Medical Decision Making  Patient is a 25 y/o male presenting to the ED for right shoulder injury.     X-ray shows no acute osseous abnormality seen by me  Recommended ortho f/u, wear sling and take NSAIDS    Patient verbalizes understanding and agrees with plan. The management plan was discussed in detail with the patient at bedside and all questions were answered. Prior to discharge, I provided both verbal and written instructions. I discussed with the patient the signs and symptoms for which to return to the emergency department.  All questions were answered and patient was comfortable with the plan of care and discharged to home. The patient agrees to return to the Emergency Department for concerns and/or progression of illness.        Amount and/or Complexity of Data Reviewed  Radiology: ordered and independent interpretation performed.             Medications - No data to display    ED Risk Strat Scores                    No data recorded                            History of Present Illness       Chief Complaint   Patient presents with    Shoulder Injury     Patient reports lifting something heavy at work last week and injuring right shoulder. States the pain has become worse. No pain medication PTA. States he stopped wearing sling because it increased his pain       Past Medical History:   Diagnosis Date    Complete blindness of right eye       History reviewed. No pertinent surgical history.   History reviewed. No pertinent family history.   Social History[1]    E-Cigarette/Vaping    E-Cigarette Use Current Every Day User       E-Cigarette/Vaping Substances      I have reviewed and agree with the history as documented.     Patient is a 25 y/o male presenting to the ED for right shoulder injury. Pt had initial injury on 4/19, seen at [facility] ED, had x-ray negative for fracture. Pt placed in sling and recommended to f/u with orthopedics, patient did not do that. Pt has been wearing the sling intermittently but it hurts to wear. Pt states yesterday he lifted a heavy box at work and felt more pain to his right arm. Pt has not taken any medication for pain. Pt denies numbness/tingling, neck pain, swelling.         Review of Systems   Musculoskeletal:         Shoulder pain   All other systems reviewed and are negative.          Objective       ED Triage Vitals [05/18/25 1637]   Temperature Pulse Blood Pressure Respirations SpO2 Patient Position - Orthostatic VS   98.2 °F (36.8 °C) 82 121/57 16 99 % Sitting      Temp Source Heart Rate Source BP Location FiO2 (%) Pain Score    Oral Monitor Left arm -- 9      Vitals      Date and Time Temp Pulse SpO2 Resp BP Pain Score FACES Pain Rating User   05/18/25 1637 98.2 °F (36.8 °C) 82 99 % 16 121/57 9 -- AMB            Physical Exam  Constitutional:       Appearance: Normal appearance.   HENT:      Head: Normocephalic and atraumatic.      Right Ear: External ear normal.      Left Ear: External ear normal.      Nose: Nose normal.      Mouth/Throat:      Lips: Pink.      Mouth: Mucous membranes are moist.     Eyes:      Extraocular Movements: Extraocular movements intact.      Conjunctiva/sclera: Conjunctivae normal.     Pulmonary:      Effort: No tachypnea or respiratory distress.     Musculoskeletal:      Right shoulder: Tenderness and bony tenderness present. No crepitus. Decreased range of motion (2/2 pain). Normal strength. Normal pulse.      Left shoulder: Normal.      Cervical back: Normal range of motion and neck supple.     Skin:      General: Skin is warm.      Capillary Refill: Capillary refill takes less than 2 seconds.     Neurological:      Mental Status: He is alert and oriented to person, place, and time.      GCS: GCS eye subscore is 4. GCS verbal subscore is 5. GCS motor subscore is 6.     Psychiatric:         Mood and Affect: Mood and affect normal.         Speech: Speech normal.         Results Reviewed       None            XR shoulder 2+ views RIGHT   ED Interpretation by Evonne Caldwell PA-C (05/18 1719)   No acute osseous abnormality seen by me              Procedures    ED Medication and Procedure Management   Prior to Admission Medications   Prescriptions Last Dose Informant Patient Reported? Taking?   SODIUM FLUORIDE, DENTAL GEL, 1.1 % CREA   No No   Sig: Brush teeth one time before bedtime, don't rinse but spit out excess.   ibuprofen (MOTRIN) 600 mg tablet   No No   Sig: Take 1 tablet (600 mg total) by mouth every 6 (six) hours as needed for mild pain, moderate pain, fever or headaches      Facility-Administered Medications: None     Patient's Medications   Discharge Prescriptions    No medications on file     No discharge procedures on file.  ED SEPSIS DOCUMENTATION   Time reflects when diagnosis was documented in both MDM as applicable and the Disposition within this note       Time User Action Codes Description Comment    5/18/2025  5:26 PM Evonne Caldwell Add [M25.511] Right shoulder pain                      [1]   Social History  Tobacco Use    Smoking status: Never   Vaping Use    Vaping status: Every Day   Substance Use Topics    Alcohol use: Yes     Comment: occasionally    Drug use: Yes     Types: Marijuana        Evonne Caldwell PA-C  05/18/25 2527

## 2025-05-18 NOTE — Clinical Note
Shin Lam was seen and treated in our emergency department on 5/18/2025.                Diagnosis:     Shin  may return to work on return date.    He may return on this date: 05/26/2025         If you have any questions or concerns, please don't hesitate to call.      Evonne Caldwell PA-C    ______________________________           _______________          _______________  Hospital Representative                              Date                                Time

## 2025-05-29 ENCOUNTER — HOSPITAL ENCOUNTER (EMERGENCY)
Facility: HOSPITAL | Age: 25
Discharge: HOME | End: 2025-05-30
Attending: EMERGENCY MEDICINE | Admitting: EMERGENCY MEDICINE
Payer: COMMERCIAL

## 2025-05-29 ENCOUNTER — APPOINTMENT (EMERGENCY)
Dept: RADIOLOGY | Facility: HOSPITAL | Age: 25
End: 2025-05-29
Payer: COMMERCIAL

## 2025-05-29 ENCOUNTER — OFFICE VISIT (OUTPATIENT)
Dept: URGENT CARE | Facility: MEDICAL CENTER | Age: 25
End: 2025-05-29
Payer: COMMERCIAL

## 2025-05-29 VITALS
RESPIRATION RATE: 12 BRPM | DIASTOLIC BLOOD PRESSURE: 58 MMHG | SYSTOLIC BLOOD PRESSURE: 118 MMHG | HEART RATE: 70 BPM | TEMPERATURE: 99 F | OXYGEN SATURATION: 100 %

## 2025-05-29 VITALS
HEART RATE: 67 BPM | SYSTOLIC BLOOD PRESSURE: 107 MMHG | RESPIRATION RATE: 16 BRPM | BODY MASS INDEX: 24.45 KG/M2 | OXYGEN SATURATION: 99 % | TEMPERATURE: 98.2 F | DIASTOLIC BLOOD PRESSURE: 56 MMHG | WEIGHT: 138.01 LBS

## 2025-05-29 DIAGNOSIS — R07.89 ATYPICAL CHEST PAIN: Primary | ICD-10-CM

## 2025-05-29 DIAGNOSIS — R06.02 SHORTNESS OF BREATH: ICD-10-CM

## 2025-05-29 DIAGNOSIS — R07.9 CHEST PAIN, UNSPECIFIED TYPE: Primary | ICD-10-CM

## 2025-05-29 LAB
ALBUMIN SERPL BCG-MCNC: 4.3 G/DL (ref 3.5–5)
ALP SERPL-CCNC: 48 U/L (ref 34–104)
ALT SERPL W P-5'-P-CCNC: 17 U/L (ref 7–52)
ANION GAP SERPL CALCULATED.3IONS-SCNC: 5 MMOL/L (ref 4–13)
AST SERPL W P-5'-P-CCNC: 21 U/L (ref 13–39)
BASOPHILS # BLD AUTO: 0.03 THOUSANDS/ÂΜL (ref 0–0.1)
BASOPHILS NFR BLD AUTO: 0 % (ref 0–1)
BILIRUB SERPL-MCNC: 0.51 MG/DL (ref 0.2–1)
BNP SERPL-MCNC: 7 PG/ML (ref 0–100)
BUN SERPL-MCNC: 16 MG/DL (ref 5–25)
CALCIUM SERPL-MCNC: 9.3 MG/DL (ref 8.4–10.2)
CARDIAC TROPONIN I PNL SERPL HS: <2 NG/L (ref ?–50)
CHLORIDE SERPL-SCNC: 104 MMOL/L (ref 96–108)
CO2 SERPL-SCNC: 28 MMOL/L (ref 21–32)
CREAT SERPL-MCNC: 0.88 MG/DL (ref 0.6–1.3)
EOSINOPHIL # BLD AUTO: 0.07 THOUSAND/ÂΜL (ref 0–0.61)
EOSINOPHIL NFR BLD AUTO: 1 % (ref 0–6)
ERYTHROCYTE [DISTWIDTH] IN BLOOD BY AUTOMATED COUNT: 12.3 % (ref 11.6–15.1)
GFR SERPL CREATININE-BSD FRML MDRD: 121 ML/MIN/1.73SQ M
GLUCOSE SERPL-MCNC: 96 MG/DL (ref 65–140)
HCT VFR BLD AUTO: 45.5 % (ref 36.5–49.3)
HGB BLD-MCNC: 15.8 G/DL (ref 12–17)
IMM GRANULOCYTES # BLD AUTO: 0.06 THOUSAND/UL (ref 0–0.2)
IMM GRANULOCYTES NFR BLD AUTO: 1 % (ref 0–2)
LYMPHOCYTES # BLD AUTO: 2.24 THOUSANDS/ÂΜL (ref 0.6–4.47)
LYMPHOCYTES NFR BLD AUTO: 33 % (ref 14–44)
MAGNESIUM SERPL-MCNC: 1.9 MG/DL (ref 1.9–2.7)
MCH RBC QN AUTO: 31.3 PG (ref 26.8–34.3)
MCHC RBC AUTO-ENTMCNC: 34.7 G/DL (ref 31.4–37.4)
MCV RBC AUTO: 90 FL (ref 82–98)
MONOCYTES # BLD AUTO: 0.74 THOUSAND/ÂΜL (ref 0.17–1.22)
MONOCYTES NFR BLD AUTO: 11 % (ref 4–12)
NEUTROPHILS # BLD AUTO: 3.69 THOUSANDS/ÂΜL (ref 1.85–7.62)
NEUTS SEG NFR BLD AUTO: 54 % (ref 43–75)
NRBC BLD AUTO-RTO: 0 /100 WBCS
PLATELET # BLD AUTO: 204 THOUSANDS/UL (ref 149–390)
PMV BLD AUTO: 9.3 FL (ref 8.9–12.7)
POTASSIUM SERPL-SCNC: 3.9 MMOL/L (ref 3.5–5.3)
PROT SERPL-MCNC: 7.1 G/DL (ref 6.4–8.4)
RBC # BLD AUTO: 5.05 MILLION/UL (ref 3.88–5.62)
SODIUM SERPL-SCNC: 137 MMOL/L (ref 135–147)
WBC # BLD AUTO: 6.83 THOUSAND/UL (ref 4.31–10.16)

## 2025-05-29 PROCEDURE — 96375 TX/PRO/DX INJ NEW DRUG ADDON: CPT

## 2025-05-29 PROCEDURE — 93005 ELECTROCARDIOGRAM TRACING: CPT

## 2025-05-29 PROCEDURE — 83880 ASSAY OF NATRIURETIC PEPTIDE: CPT | Performed by: EMERGENCY MEDICINE

## 2025-05-29 PROCEDURE — 80053 COMPREHEN METABOLIC PANEL: CPT | Performed by: EMERGENCY MEDICINE

## 2025-05-29 PROCEDURE — 99213 OFFICE O/P EST LOW 20 MIN: CPT | Performed by: NURSE PRACTITIONER

## 2025-05-29 PROCEDURE — 85025 COMPLETE CBC W/AUTO DIFF WBC: CPT | Performed by: EMERGENCY MEDICINE

## 2025-05-29 PROCEDURE — 99285 EMERGENCY DEPT VISIT HI MDM: CPT | Performed by: EMERGENCY MEDICINE

## 2025-05-29 PROCEDURE — 99285 EMERGENCY DEPT VISIT HI MDM: CPT

## 2025-05-29 PROCEDURE — 84484 ASSAY OF TROPONIN QUANT: CPT | Performed by: EMERGENCY MEDICINE

## 2025-05-29 PROCEDURE — S9088 SERVICES PROVIDED IN URGENT: HCPCS | Performed by: NURSE PRACTITIONER

## 2025-05-29 PROCEDURE — 96361 HYDRATE IV INFUSION ADD-ON: CPT

## 2025-05-29 PROCEDURE — 93005 ELECTROCARDIOGRAM TRACING: CPT | Performed by: NURSE PRACTITIONER

## 2025-05-29 PROCEDURE — 94640 AIRWAY INHALATION TREATMENT: CPT | Performed by: NURSE PRACTITIONER

## 2025-05-29 PROCEDURE — 96374 THER/PROPH/DIAG INJ IV PUSH: CPT

## 2025-05-29 PROCEDURE — 83735 ASSAY OF MAGNESIUM: CPT | Performed by: EMERGENCY MEDICINE

## 2025-05-29 PROCEDURE — 36415 COLL VENOUS BLD VENIPUNCTURE: CPT | Performed by: EMERGENCY MEDICINE

## 2025-05-29 PROCEDURE — 71046 X-RAY EXAM CHEST 2 VIEWS: CPT

## 2025-05-29 RX ORDER — METHOCARBAMOL 500 MG/1
500 TABLET, FILM COATED ORAL ONCE
Status: COMPLETED | OUTPATIENT
Start: 2025-05-29 | End: 2025-05-29

## 2025-05-29 RX ORDER — LIDOCAINE 50 MG/G
1 PATCH TOPICAL ONCE
Status: DISCONTINUED | OUTPATIENT
Start: 2025-05-29 | End: 2025-05-30 | Stop reason: HOSPADM

## 2025-05-29 RX ORDER — METHOCARBAMOL 500 MG/1
500 TABLET, FILM COATED ORAL 2 TIMES DAILY
Qty: 20 TABLET | Refills: 0 | Status: SHIPPED | OUTPATIENT
Start: 2025-05-29

## 2025-05-29 RX ORDER — IPRATROPIUM BROMIDE AND ALBUTEROL SULFATE 2.5; .5 MG/3ML; MG/3ML
3 SOLUTION RESPIRATORY (INHALATION) ONCE
Status: COMPLETED | OUTPATIENT
Start: 2025-05-29 | End: 2025-05-29

## 2025-05-29 RX ORDER — DEXAMETHASONE SODIUM PHOSPHATE 10 MG/ML
10 INJECTION, SOLUTION INTRAMUSCULAR; INTRAVENOUS ONCE
Status: COMPLETED | OUTPATIENT
Start: 2025-05-29 | End: 2025-05-29

## 2025-05-29 RX ORDER — KETOROLAC TROMETHAMINE 30 MG/ML
30 INJECTION, SOLUTION INTRAMUSCULAR; INTRAVENOUS ONCE
Status: COMPLETED | OUTPATIENT
Start: 2025-05-29 | End: 2025-05-29

## 2025-05-29 RX ORDER — LIDOCAINE 50 MG/G
1 PATCH TOPICAL DAILY
Qty: 4 PATCH | Refills: 0 | Status: SHIPPED | OUTPATIENT
Start: 2025-05-29 | End: 2025-06-02

## 2025-05-29 RX ORDER — ACETAMINOPHEN 325 MG/1
975 TABLET ORAL ONCE
Status: COMPLETED | OUTPATIENT
Start: 2025-05-29 | End: 2025-05-29

## 2025-05-29 RX ADMIN — IPRATROPIUM BROMIDE AND ALBUTEROL SULFATE 3 ML: 2.5; .5 SOLUTION RESPIRATORY (INHALATION) at 20:17

## 2025-05-29 RX ADMIN — METHOCARBAMOL 500 MG: 500 TABLET ORAL at 23:27

## 2025-05-29 RX ADMIN — SODIUM CHLORIDE 1000 ML: 0.9 INJECTION, SOLUTION INTRAVENOUS at 22:16

## 2025-05-29 RX ADMIN — ACETAMINOPHEN 975 MG: 325 TABLET, FILM COATED ORAL at 22:05

## 2025-05-29 RX ADMIN — DEXAMETHASONE SODIUM PHOSPHATE 10 MG: 10 INJECTION, SOLUTION INTRAMUSCULAR; INTRAVENOUS at 23:27

## 2025-05-29 RX ADMIN — LIDOCAINE 1 PATCH: 700 PATCH TOPICAL at 22:06

## 2025-05-29 RX ADMIN — KETOROLAC TROMETHAMINE 30 MG: 30 INJECTION, SOLUTION INTRAMUSCULAR; INTRAVENOUS at 22:17

## 2025-05-29 NOTE — Clinical Note
Shin Lam was seen and treated in our emergency department on 5/29/2025.                Diagnosis:     Shin  is off the rest of the shift today.    He may return on this date: 06/01/2025         If you have any questions or concerns, please don't hesitate to call.      Candice Sr, DO    ______________________________           _______________          _______________  Hospital Representative                              Date                                Time

## 2025-05-29 NOTE — PROGRESS NOTES
[facility]        NAME: Shin Lam is a 25 y.o. male  : 2000    MRN: [id number]  DATE: May 31, 2025  TIME: 3:22 PM    Assessment and Plan   Chest pain, unspecified type [R07.9]  1. Chest pain, unspecified type  ECG 12 lead    Transfer to other facility    Mini neb      2. Shortness of breath  ipratropium-albuterol (DUO-NEB) 0.5-2.5 mg/3 mL inhalation solution 3 mL    Transfer to other facility    Mini neb        Patient in NAD and VSS upon exam. EKG with no acute findings. Patient given duoneb in office and reports improvement of SOB but no improvement of CP. Recommend going to ED for further evaluation, patient agreeable, declined EMS, transfer order sent.    Patient Instructions       Follow up with PCP in 3-5 days.  Proceed to  ER if symptoms worsen.    If tests have been performed at [facility], our office will contact you with results if changes need to be made to the care plan discussed with you at the visit.  You can review your full results on [facility].    Chief Complaint     Chief Complaint   Patient presents with   • Chest Pain     Patient reports chest pain and SOB that has lasted for 2 weeks. Reports hx of asthma. Does not currently use inhaler         History of Present Illness       Started: 2 weeks  Positive: left sided chest pain, increasing SOB  Negative: fevers  Treatment: none  Reports hx of asthma but does not have an inhaler         Review of Systems   Review of Systems   Constitutional:  Negative for fever.   Respiratory:  Positive for chest tightness and shortness of breath.    Cardiovascular:  Positive for chest pain.         Current Medications     Current Medications[1]    Current Allergies     Allergies as of 2025   • (No Known Allergies)            The following portions of the patient's history were reviewed and updated as appropriate: allergies, current medications, past family history, past medical history, past social history, past surgical history  and problem list.     Past Medical History[2]    Past Surgical History[3]    Family History[4]      Medications have been verified.        Objective   /58   Pulse 70   Temp 99 °F (37.2 °C)   Resp 12   SpO2 100%   No LMP for male patient.       Physical Exam     Physical Exam  Constitutional:       General: He is not in acute distress.     Appearance: Normal appearance. He is not ill-appearing.   HENT:      Head: Normocephalic and atraumatic.     Cardiovascular:      Rate and Rhythm: Normal rate.      Heart sounds: Normal heart sounds.   Pulmonary:      Effort: Pulmonary effort is normal.      Breath sounds: Decreased breath sounds present. No wheezing, rhonchi or rales.      Comments: No cough on exam  Chest:      Chest wall: No tenderness.     Skin:     General: Skin is warm and dry.     Neurological:      Mental Status: He is alert.           Mini neb    Performed by: COREY Renteria  Authorized by: COREY Renteria    Universal Protocol:  Consent: Verbal consent obtained  Risks and benefits: risks, benefits and alternatives were discussed  Consent given by: patient  Patient understanding: patient states understanding of the procedure being performed    Number of treatments:  1  Treatment 1:   Pre-Procedure     Symptoms:  Chest pain and shortness of breath    Lung Sounds:  Decreased    HR:  See vitals    Medication Administered:  Duoneb - Albuterol 2.5 mg/Atrovent 0.5 mg  Post-Procedure     Symptoms:  Chest pain    Lung sounds:  Improved movement    HR:  See vitals                   [1]    Current Outpatient Medications:   •  ibuprofen (MOTRIN) 600 mg tablet, Take 1 tablet (600 mg total) by mouth every 6 (six) hours as needed for mild pain, moderate pain, fever or headaches, Disp: 30 tablet, Rfl: 0  •  SODIUM FLUORIDE, DENTAL GEL, 1.1 % CREA, Brush teeth one time before bedtime, don't rinse but spit out excess., Disp: 100 g, Rfl: 2  •  lidocaine (LIDODERM) 5 %, Apply 1 patch topically daily  over 12 hours for 4 days Remove & Discard patch within 12 hours or as directed by MD, Disp: 4 patch, Rfl: 0  •  methocarbamol (ROBAXIN) 500 mg tablet, Take 1 tablet (500 mg total) by mouth 2 (two) times a day, Disp: 20 tablet, Rfl: 0[2]  Past Medical History:  Diagnosis Date   • Asthma    • Complete blindness of right eye    [3]  No past surgical history on file.[4]  No family history on file.

## 2025-05-30 LAB
ATRIAL RATE: 58 BPM
ATRIAL RATE: 63 BPM
P AXIS: 76 DEGREES
P AXIS: 99 DEGREES
PR INTERVAL: 114 MS
PR INTERVAL: 132 MS
QRS AXIS: 50 DEGREES
QRS AXIS: 69 DEGREES
QRSD INTERVAL: 100 MS
QRSD INTERVAL: 94 MS
QT INTERVAL: 394 MS
QT INTERVAL: 400 MS
QTC INTERVAL: 392 MS
QTC INTERVAL: 404 MS
T WAVE AXIS: 53 DEGREES
T WAVE AXIS: 53 DEGREES
VENTRICULAR RATE: 58 BPM
VENTRICULAR RATE: 63 BPM

## 2025-05-30 PROCEDURE — 93010 ELECTROCARDIOGRAM REPORT: CPT | Performed by: NURSE PRACTITIONER

## 2025-05-30 PROCEDURE — 93010 ELECTROCARDIOGRAM REPORT: CPT | Performed by: INTERNAL MEDICINE

## 2025-05-30 NOTE — ED PROVIDER NOTES
Time reflects when diagnosis was documented in both MDM as applicable and the Disposition within this note       Time User Action Codes Description Comment    5/29/2025 11:16 PM Amarifamiila Candice ABDIAS Add [R07.89] Atypical chest pain           ED Disposition       ED Disposition   Discharge    Condition   Stable    Date/Time   Thu May 29, 2025 11:16 PM    Comment   Shin Lam discharge to home/self care.                   Assessment & Plan       Medical Decision Making      Different diagnosis : ACS, NSTEMI, pleurisry, pneumothorax, costochondritis, pneumonia, GERD, anxiety, hepatitis, pancreatitis, aortic dissection, pericarditis, myocarditis, pericardial effusion, asthma exacerbation, COPD exacerbation, herpes zoster, peritoneal rupture, esophageal spasm.      Will obtain EKG to rule out and assess for STEMI versus arrhythmia versuso interval abnormality versus ischemic changes; troponin to evaluate for ischemia; CBC to assess for leukocytosis or anemia; CMP to assess for ADRIANA versus electrolyte abnormalities versus elevated LFTs.  Will also obtain chest x-ray to rule out pneumothorax versus pneumonia versus effusion versus CHF         Amount and/or Complexity of Data Reviewed  External Data Reviewed: notes.     Details:       Patient was seen at outpatient center and sent in for further evaluation.  Patient there was given DuoNeb and had EKG completed.      Labs: ordered. Decision-making details documented in ED Course.     Details:   No anemia, thrombocytopenia leukocytosis  No acute kidney injury electrolyte abnormality  Troponin less than 2 with heart score less than 3  proBNP within normal range      Radiology: ordered and independent interpretation performed. Decision-making details documented in ED Course.     Details:   Chest x-ray interpreted by myself as no acute finding  ECG/medicine tests: ordered and independent interpretation performed. Decision-making details documented in ED Course.     Details:   No  "ischemia or arrhythmia    Risk  OTC drugs.  Prescription drug management.          ED Course as of 05/29/25 2323   Thu May 29, 2025   2158 Patient is a very pleasant 25-year-old male coming in today with chest pain and shortness of breath that has been ongoing intermittently over the past 2 weeks.  On exam he is well-appearing in no distress.  Will start cardiac workup.  Pain is reproducible with resistance to the left upper extremity for many muscle test.  He is PERC negative and low Wells therefore no D-dimer.  Will give multimodal medication.      Disclosure: Voice to text software was used in the preparation of this document and could have resulted in translational errors.      Occasional wrong word or \"sound a like\" substitutions may have occurred due to the inherent limitations of voice recognition software.  Read the chart carefully and recognize, using context, where substitutions have occurred.       I have independently reviewed external records are available to me to the level of detail possible within the time constraints of my patient care responsibilities in the ED.       2229 CBC and differential  WNL       2304 HS Troponin 0hr (reflex protocol)  <2       2305 Patient has no evidence of end organ damage.        2314 Patient resting in bed.  Patient states he has decrease in pain out of 100% resolved.  Discussed with him chest x-ray, EKG and lab work.  No evidence of endorgan damage.  PERC negative and low Wells.  No D-dimer.  Strict return to ER instructions given.  Discussed with him we will give him Robaxin as well as Decadron.  Concern for inflammatory component.  Patient agreeable plan of care      Counseling: I had a detailed discussion with the patient and/or guardian regarding: the historical points, exam findings, and any diagnostic results supporting the discharge diagnosis, lab results, radiology results, discharge instructions reviewed with patient and/or family/caregiver and understanding " was verbalized. Instructions given to return to the emergency department if symptoms worsen or persist, or if there are any questions or concerns that arise at home.     All imaging and/or lab testing discussed with patient, strict return to ED precautions discussed. Patient recommended to follow up promptly with appropriate outpatient provider. Patient and/or family members verbalizes understanding and agrees with plan. Patient and/or family members were given opportunity to ask questions, all questions were answered at this time. Patient is stable for discharge           Medications   lidocaine (LIDODERM) 5 % patch 1 patch (1 patch Topical Medication Applied 5/29/25 2206)   dexamethasone (PF) (DECADRON) injection 10 mg (has no administration in time range)   methocarbamol (ROBAXIN) tablet 500 mg (has no administration in time range)   sodium chloride 0.9 % bolus 1,000 mL (1,000 mL Intravenous New Bag 5/29/25 2216)   ketorolac (TORADOL) injection 30 mg (30 mg Intravenous Given 5/29/25 2217)   acetaminophen (TYLENOL) tablet 975 mg (975 mg Oral Given 5/29/25 2205)       ED Risk Strat Scores   HEART Risk Score      Flowsheet Row Most Recent Value   Heart Score Risk Calculator    History 0 Filed at: 05/29/2025 2304   ECG 1 Filed at: 05/29/2025 2304   Age 0 Filed at: 05/29/2025 2304   Risk Factors 0 Filed at: 05/29/2025 2304   Troponin 0 Filed at: 05/29/2025 2304   HEART Score 1 Filed at: 05/29/2025 2304          HEART Risk Score      Flowsheet Row Most Recent Value   Heart Score Risk Calculator    History 0 Filed at: 05/29/2025 2304   ECG 1 Filed at: 05/29/2025 2304   Age 0 Filed at: 05/29/2025 2304   Risk Factors 0 Filed at: 05/29/2025 2304   Troponin 0 Filed at: 05/29/2025 2304   HEART Score 1 Filed at: 05/29/2025 2304                      No data recorded    PERC Rule for PE      Flowsheet Row Most Recent Value   PERC Rule for PE    Age >=50 0 Filed at: 05/29/2025 2159   HR >=100 0 Filed at: 05/29/2025 2159   O2 Sat  "on room air < 95% 0 Filed at: 05/29/2025 2159   History of PE or DVT 0 Filed at: 05/29/2025 2159   Recent trauma or surgery 0 Filed at: 05/29/2025 2159   Hemoptysis 0 Filed at: 05/29/2025 2159   Exogenous estrogen 0 Filed at: 05/29/2025 2159   Unilateral leg swelling 0 Filed at: 05/29/2025 2159   PERC Rule for PE Results 0 Filed at: 05/29/2025 2159            SBIRT 20yo+      Flowsheet Row Most Recent Value   Initial Alcohol Screen: US AUDIT-C     1. How often do you have a drink containing alcohol? 4 Filed at: 05/29/2025 2154   2. How many drinks containing alcohol do you have on a typical day you are drinking?  0 Filed at: 05/29/2025 2154   3a. Male UNDER 65: How often do you have five or more drinks on one occasion? 0 Filed at: 05/29/2025 2154   3b. FEMALE Any Age, or MALE 65+: How often do you have 4 or more drinks on one occassion? 0 Filed at: 05/29/2025 2154   Audit-C Score 4 Filed at: 05/29/2025 2154   NYDIA: How many times in the past year have you...    Used an illegal drug or used a prescription medication for non-medical reasons? Daily or Almost Daily  [marijuanna multiple times a day] Filed at: 05/29/2025 2154   DAST-10: In the past 12 months...    1. Have you used drugs other than those required for medical reasons? 0 Filed at: 05/29/2025 2154   2. Do you use more than one drug at a time? 0 Filed at: 05/29/2025 2154   3. Have you had medical problems as a result of your drug use (e.g., memory loss, hepatitis, convulsions, bleeding, etc.)? 0 Filed at: 05/29/2025 2154   4. Have you had \"blackouts\" or \"flashbacks\" as a result of drug use?YesNo 0 Filed at: 05/29/2025 2154   5. Do you ever feel bad or guilty about your drug use? 0 Filed at: 05/29/2025 2154   6. Does your spouse (or parent) ever complain about your involvement with drugs? 0 Filed at: 05/29/2025 2154   7. Have you neglected your family because of your use of drugs? 0 Filed at: 05/29/2025 2154   8. Have you engaged in illegal activities in order " to obtain drugs? 0 Filed at: 05/29/2025 2154   9. Have you ever experienced withdrawal symptoms (felt sick) when you stopped taking drugs? 0 Filed at: 05/29/2025 2154   10. Are you always able to stop using drugs when you want to? 0 Filed at: 05/29/2025 2154   DAST-10 Score 0 Filed at: 05/29/2025 2154            Wells' Criteria for PE      Flowsheet Row Most Recent Value   Wells' Criteria for PE    Clinical signs and symptoms of DVT 0 Filed at: 05/29/2025 2159   PE is primary diagnosis or equally likely 0 Filed at: 05/29/2025 2159   HR >100 0 Filed at: 05/29/2025 2159   Immobilization at least 3 days or Surgery in the previous 4 weeks 0 Filed at: 05/29/2025 2159   Previous, objectively diagnosed PE or DVT 0 Filed at: 05/29/2025 2159   Hemoptysis 0 Filed at: 05/29/2025 2159   Malignancy with treatment within 6 months or palliative 0 Filed at: 05/29/2025 2159   Wells' Criteria Total 0 Filed at: 05/29/2025 2159                        History of Present Illness       Chief Complaint   Patient presents with    Chest Pain     Patient reports chest pain, was seen earlier at urgent care for accompanying SOB and was given a breathing treatment, patient reports SOB is better but reports chest pain remains.        Past Medical History[1]   Past Surgical History[2]   Family History[3]   Social History[4]   E-Cigarette/Vaping    E-Cigarette Use Current Every Day User       E-Cigarette/Vaping Substances      I have reviewed and agree with the history as documented.     Patient is a very pleasant 35-year-old male who otherwise healthy coming in today complaining of chest pain has been remittent for the past 2 weeks.  Patient states he has no recent travel, sick contacts, recent surgeries and no past history of clotting disorders.  He states that he has no trauma but has pain to the left side that is worse with movement or inspiration.  He denies any new changes in workout.  He has not taken anything for the pain.  The pain can  move and wraparound to his back but not through his back.  He has no fevers, chills, nausea, vomiting, diarrhea.  Patient states that he is unaware of any family history of sudden death or early coronary disease      History provided by:  Medical records and patient   used: No    Chest Pain  Pain location:  L chest and substernal area  Pain quality: aching, dull and pressure    Pain radiates to:  Upper back  Pain severity:  Mild  Onset quality:  Gradual  Duration:  2 weeks  Timing:  Constant  Progression:  Waxing and waning  Chronicity:  New  Context: breathing and movement    Relieved by:  Nothing  Worsened by:  Deep breathing and movement  Ineffective treatments:  None tried  Associated symptoms: no abdominal pain, no AICD problem, no altered mental status, no anorexia, no anxiety, no back pain, no claudication, no cough, no diaphoresis, no dizziness, no dysphagia, no fatigue, no fever, no headache, no heartburn, no lower extremity edema, no nausea, no near-syncope, no numbness, no orthopnea, no palpitations, no PND, no shortness of breath, no syncope, not vomiting and no weakness    Risk factors: male sex    Risk factors: no aortic disease, no birth control, no coronary artery disease, no diabetes mellitus, no Alhaji-Danlos syndrome, no high cholesterol, no hypertension, no immobilization, no Marfan's syndrome, not obese, no prior DVT/PE and no surgery        Review of Systems   Constitutional: Negative.  Negative for chills, diaphoresis, fatigue and fever.   HENT: Negative.  Negative for ear pain, sore throat and trouble swallowing.    Eyes: Negative.  Negative for pain and visual disturbance.   Respiratory: Negative.  Negative for cough and shortness of breath.    Cardiovascular:  Positive for chest pain. Negative for palpitations, orthopnea, claudication, syncope, PND and near-syncope.   Gastrointestinal: Negative.  Negative for abdominal pain, anorexia, heartburn, nausea and vomiting.    Genitourinary: Negative.  Negative for dysuria and hematuria.   Musculoskeletal: Negative.  Negative for arthralgias and back pain.   Skin:  Negative for color change and rash.   Neurological:  Negative for dizziness, seizures, syncope, weakness, numbness and headaches.   Hematological: Negative.    Psychiatric/Behavioral: Negative.     All other systems reviewed and are negative.          Objective       ED Triage Vitals [05/29/25 2140]   Temperature Pulse Blood Pressure Respirations SpO2 Patient Position - Orthostatic VS   98.2 °F (36.8 °C) 86 112/66 16 100 % Sitting      Temp Source Heart Rate Source BP Location FiO2 (%) Pain Score    Oral Monitor Right arm -- 10 - Worst Possible Pain      Vitals      Date and Time Temp Pulse SpO2 Resp BP Pain Score FACES Pain Rating User   05/29/25 2216 -- -- -- -- -- 8 -- MA   05/29/25 2205 -- -- -- -- -- 8 -- MA   05/29/25 2140 98.2 °F (36.8 °C) 86 100 % 16 112/66 10 - Worst Possible Pain -- AF            Physical Exam  Vitals and nursing note reviewed.   Constitutional:       General: He is awake. He is not in acute distress.     Appearance: Normal appearance. He is well-developed and normal weight.   HENT:      Head: Normocephalic and atraumatic.      Right Ear: External ear normal.      Left Ear: External ear normal.      Nose: Nose normal.      Mouth/Throat:      Mouth: Mucous membranes are moist.     Eyes:      Extraocular Movements: Extraocular movements intact.      Conjunctiva/sclera: Conjunctivae normal.      Pupils: Pupils are equal, round, and reactive to light.       Cardiovascular:      Rate and Rhythm: Normal rate and regular rhythm.      Pulses:           Radial pulses are 2+ on the right side and 2+ on the left side.        Dorsalis pedis pulses are 2+ on the right side and 2+ on the left side.      Heart sounds: Normal heart sounds, S1 normal and S2 normal. No murmur heard.  Pulmonary:      Effort: Pulmonary effort is normal. No respiratory distress.       Breath sounds: Normal breath sounds.      Comments: Lungs are clear to auscultation with no conversational dyspnea  Chest:   Breasts:     Right: Normal.        Comments: With 3 producible chest pain primarily with resistance of left shoulder adduction as well as extension, flexion  Abdominal:      Palpations: Abdomen is soft.      Tenderness: There is no abdominal tenderness.     Musculoskeletal:         General: No swelling.      Cervical back: Normal range of motion and neck supple.      Right lower leg: No edema.      Left lower leg: No edema.     Skin:     General: Skin is warm and dry.      Capillary Refill: Capillary refill takes less than 2 seconds.     Neurological:      General: No focal deficit present.      Mental Status: He is alert and oriented to person, place, and time.      GCS: GCS eye subscore is 4. GCS verbal subscore is 5. GCS motor subscore is 6.      Cranial Nerves: Cranial nerves 2-12 are intact.      Sensory: Sensation is intact.      Motor: Motor function is intact.      Coordination: Coordination is intact.      Comments: No slurred speech, facial asymmetry or tongue deviation   Psychiatric:         Mood and Affect: Mood normal.         Behavior: Behavior is cooperative.         Results Reviewed       Procedure Component Value Units Date/Time    HS Troponin 0hr (reflex protocol) [[id number]]  (Normal) Collected: 05/29/25 2218    Lab Status: Final result Specimen: Blood from Arm, Left Updated: 05/29/25 2249     hs TnI 0hr <2 ng/L     B-Type Natriuretic Peptide(BNP) [[id number]]  (Normal) Collected: 05/29/25 2218    Lab Status: Final result Specimen: Blood from Arm, Left Updated: 05/29/25 2248     BNP 7 pg/mL     Comprehensive metabolic panel [[id number]] Collected: 05/29/25 2218    Lab Status: Final result Specimen: Blood from Arm, Left Updated: 05/29/25 2242     Sodium 137 mmol/L      Potassium 3.9 mmol/L      Chloride 104 mmol/L      CO2 28 mmol/L      ANION GAP 5 mmol/L      BUN 16 mg/dL       Creatinine 0.88 mg/dL      Glucose 96 mg/dL      Calcium 9.3 mg/dL      AST 21 U/L      ALT 17 U/L      Alkaline Phosphatase 48 U/L      Total Protein 7.1 g/dL      Albumin 4.3 g/dL      Total Bilirubin 0.51 mg/dL      eGFR 121 ml/min/1.73sq m     Narrative:      [employer] Kid[employer] guidelines for Chronic Kidney Disease (CKD):     Stage 1 with normal or high GFR (GFR > 90 mL/min/1.73 square meters)    Stage 2 Mild CKD (GFR = 60-89 mL/min/1.73 square meters)    Stage 3A Moderate CKD (GFR = 45-59 mL/min/1.73 square meters)    Stage 3B Moderate CKD (GFR = 30-44 mL/min/1.73 square meters)    Stage 4 Severe CKD (GFR = 15-29 mL/min/1.73 square meters)    Stage 5 End Stage CKD (GFR <15 mL/min/1.73 square meters)  Note: GFR calculation is accurate only with a steady state creatinine    Magnesium [[id number]]  (Normal) Collected: 05/29/25 2218    Lab Status: Final result Specimen: Blood from Arm, Left Updated: 05/29/25 2242     Magnesium 1.9 mg/dL     CBC and differential [[id number]] Collected: 05/29/25 2218    Lab Status: Final result Specimen: Blood from Arm, Left Updated: 05/29/25 2224     WBC 6.83 Thousand/uL      RBC 5.05 Million/uL      Hemoglobin 15.8 g/dL      Hematocrit 45.5 %      MCV 90 fL      MCH 31.3 pg      MCHC 34.7 g/dL      RDW 12.3 %      MPV 9.3 fL      Platelets 204 Thousands/uL      nRBC 0 /100 WBCs      Segmented % 54 %      Immature Grans % 1 %      Lymphocytes % 33 %      Monocytes % 11 %      Eosinophils Relative 1 %      Basophils Relative 0 %      Absolute Neutrophils 3.69 Thousands/µL      Absolute Immature Grans 0.06 Thousand/uL      Absolute Lymphocytes 2.24 Thousands/µL      Absolute Monocytes 0.74 Thousand/µL      Eosinophils Absolute 0.07 Thousand/µL      Basophils Absolute 0.03 Thousands/µL             XR chest 2 views   ED Interpretation by Candice Sr DO (05/29 2306)   No acute findings              ECG 12 Lead Documentation Only    Date/Time: 5/29/2025 10:02  PM    Performed by: Candice Sr DO  Authorized by: Candice Sr DO    Indications / Diagnosis:  Chest pain  ECG reviewed by me, the ED Provider: yes    Patient location:  ED  Previous ECG:     Previous ECG:  Compared to current    Comparison ECG info:  5/29/25 @ 1956    Similarity:  No change  Interpretation:     Interpretation: non-specific    Quality:     Tracing quality:  Limited by artifact  Rate:     ECG rate:  58    ECG rate assessment: bradycardic    Rhythm:     Rhythm: sinus rhythm    Ectopy:     Ectopy: none    QRS:     QRS axis:  Normal    QRS intervals: QRS measured at 100 ms.  Incomplete right bundle branch block.  Conduction:     Conduction: normal    ST segments:     ST segments:  Non-specific  T waves:     T waves: non-specific    Other findings:     Other findings: LVH    Comments:      Normal axis  QTc measured at 392 ms      ED Medication and Procedure Management   Prior to Admission Medications   Prescriptions Last Dose Informant Patient Reported? Taking?   SODIUM FLUORIDE, DENTAL GEL, 1.1 % CREA   No No   Sig: Brush teeth one time before bedtime, don't rinse but spit out excess.   ibuprofen (MOTRIN) 600 mg tablet   No No   Sig: Take 1 tablet (600 mg total) by mouth every 6 (six) hours as needed for mild pain, moderate pain, fever or headaches      Facility-Administered Medications Last Administration Doses Remaining   ipratropium-albuterol (DUO-NEB) 0.5-2.5 mg/3 mL inhalation solution 3 mL 5/29/2025  8:17 PM 0        Patient's Medications   Discharge Prescriptions    LIDOCAINE (LIDODERM) 5 %    Apply 1 patch topically daily over 12 hours for 4 days Remove & Discard patch within 12 hours or as directed by MD       Start Date: 5/29/2025 End Date: 6/2/2025       Order Dose: 1 patch       Quantity: 4 patch    Refills: 0    METHOCARBAMOL (ROBAXIN) 500 MG TABLET    Take 1 tablet (500 mg total) by mouth 2 (two) times a day       Start Date: 5/29/2025 End Date: --       Order Dose: 500 mg        Quantity: 20 tablet    Refills: 0       ED SEPSIS DOCUMENTATION   Time reflects when diagnosis was documented in both MDM as applicable and the Disposition within this note       Time User Action Codes Description Comment    5/29/2025 11:16 PM Candice Sr Add [R07.89] Atypical chest pain                    [1]   Past Medical History:  Diagnosis Date    Asthma     Complete blindness of right eye    [2] No past surgical history on file.  [3] No family history on file.  [4]   Social History  Tobacco Use    Smoking status: Never   Vaping Use    Vaping status: Every Day   Substance Use Topics    Alcohol use: Yes     Comment: occasionally    Drug use: Yes     Types: Marijuana        Candice Sr DO  05/29/25 0097

## 2025-05-30 NOTE — DISCHARGE INSTRUCTIONS
As discussed, your workup in the emergency department did not reveal any abnormality on your lab work, chest x-ray or EKG.    Please call family doctor in the morning for close follow-up.  If you have any worsening symptoms return to the ER    Happy birthday

## 2025-05-31 ENCOUNTER — HOSPITAL ENCOUNTER (EMERGENCY)
Facility: HOSPITAL | Age: 25
Discharge: HOME | End: 2025-05-31
Attending: EMERGENCY MEDICINE | Admitting: EMERGENCY MEDICINE
Payer: COMMERCIAL

## 2025-05-31 VITALS
DIASTOLIC BLOOD PRESSURE: 55 MMHG | HEART RATE: 74 BPM | RESPIRATION RATE: 16 BRPM | SYSTOLIC BLOOD PRESSURE: 115 MMHG | TEMPERATURE: 98.1 F | OXYGEN SATURATION: 99 %

## 2025-05-31 DIAGNOSIS — R07.89 ACUTE CHEST WALL PAIN: Primary | ICD-10-CM

## 2025-05-31 PROCEDURE — 93005 ELECTROCARDIOGRAM TRACING: CPT

## 2025-05-31 PROCEDURE — 99284 EMERGENCY DEPT VISIT MOD MDM: CPT

## 2025-05-31 PROCEDURE — 99284 EMERGENCY DEPT VISIT MOD MDM: CPT | Performed by: EMERGENCY MEDICINE

## 2025-05-31 PROCEDURE — 96372 THER/PROPH/DIAG INJ SC/IM: CPT

## 2025-05-31 RX ORDER — KETOROLAC TROMETHAMINE 30 MG/ML
15 INJECTION, SOLUTION INTRAMUSCULAR; INTRAVENOUS ONCE
Status: COMPLETED | OUTPATIENT
Start: 2025-05-31 | End: 2025-05-31

## 2025-05-31 RX ADMIN — KETOROLAC TROMETHAMINE 15 MG: 30 INJECTION, SOLUTION INTRAMUSCULAR; INTRAVENOUS at 17:59

## 2025-05-31 NOTE — Clinical Note
Shin Lam was seen and treated in our emergency department on 5/31/2025.    No restrictions            Diagnosis:     Shin  may return to work on return date.    He may return on this date: 06/02/2025         If you have any questions or concerns, please don't hesitate to call.      Henry Mancera MD    ______________________________           _______________          _______________  Hospital Representative                              Date                                Time

## 2025-05-31 NOTE — ED PROVIDER NOTES
Time reflects when diagnosis was documented in both MDM as applicable and the Disposition within this note       Time User Action Codes Description Comment    5/31/2025  6:00 PM Henry Mancera Add [R07.89] Acute chest wall pain           ED Disposition       ED Disposition   Discharge    Condition   Stable    Date/Time   Sat May 31, 2025  6:00 PM    Comment   Shin Lam discharge to home/self care.                   Assessment & Plan       Medical Decision Making  Reproducible left-sided chest pain likely secondary to costochondritis.  Will do EKG to rule dysrhythmia, pericarditis.  Patient had a normal workup which ruled out pneumonia, pneumothorax, congestive heart failure, acute coronary syndrome and repeat blood work is not indicated.  Patient is PERC negative and workup for pulmonary embolism is not indicated.    Risk  Prescription drug management.             Medications   ketorolac (TORADOL) injection 15 mg (15 mg Intramuscular Given 5/31/25 1759)       ED Risk Strat Scores                    No data recorded        SBIRT 22yo+      Flowsheet Row Most Recent Value   Initial Alcohol Screen: US AUDIT-C     1. How often do you have a drink containing alcohol? 4 Filed at: 05/31/2025 1742   2. How many drinks containing alcohol do you have on a typical day you are drinking?  2 Filed at: 05/31/2025 1742   3a. Male UNDER 65: How often do you have five or more drinks on one occasion? 0 Filed at: 05/31/2025 1742   3b. FEMALE Any Age, or MALE 65+: How often do you have 4 or more drinks on one occassion? 0 Filed at: 05/31/2025 1742   Audit-C Score 6 Filed at: 05/31/2025 1742   NYDIA: How many times in the past year have you...    Used an illegal drug or used a prescription medication for non-medical reasons? Never Filed at: 05/31/2025 1742                            History of Present Illness       Chief Complaint   Patient presents with    Chest Pain     Pt c/o of ongoing chest pain, sob and left arm weakness for 2wks  on. Pt was seen here yesterday       Past Medical History[1]   Past Surgical History[2]   Family History[3]   Social History[4]   E-Cigarette/Vaping    E-Cigarette Use Current Every Day User       E-Cigarette/Vaping Substances      I have reviewed and agree with the history as documented.     24-year-old male presents for reevaluation of persistent left-sided chest pain.  Patient was seen and evaluated yesterday by Dr. Candice Sr and had a negative cardiac workup.  Patient has no respecters for DVT or pulmonary embolism.  He was sent home on Robaxin, LMX cream, ibuprofen.  He states these are not really helping.      History provided by:  Patient and medical records  Chest Pain  Associated symptoms: no abdominal pain, no fatigue, no fever, no nausea, no numbness, no palpitations, no shortness of breath, not vomiting and no weakness        Review of Systems   Constitutional:  Negative for activity change, appetite change, fatigue and fever.   HENT:  Negative for congestion, dental problem, ear pain, rhinorrhea and sore throat.    Eyes:  Negative for pain and redness.   Respiratory:  Negative for chest tightness, shortness of breath and wheezing.    Cardiovascular:  Positive for chest pain. Negative for palpitations.   Gastrointestinal:  Negative for abdominal pain, blood in stool, constipation, diarrhea, nausea and vomiting.   Endocrine: Negative for cold intolerance and heat intolerance.   Genitourinary:  Negative for dysuria, frequency and hematuria.   Musculoskeletal:  Negative for arthralgias and myalgias.   Skin:  Negative for color change, pallor and rash.   Neurological:  Negative for weakness and numbness.   Hematological:  Does not bruise/bleed easily.   Psychiatric/Behavioral:  Negative for agitation, hallucinations and suicidal ideas.            Objective       ED Triage Vitals   Temperature Pulse Blood Pressure Respirations SpO2 Patient Position - Orthostatic VS   05/31/25 1731 05/31/25 1731 05/31/25  1731 05/31/25 1731 05/31/25 1731 05/31/25 1731   98.1 °F (36.7 °C) 74 115/55 16 99 % Lying      Temp Source Heart Rate Source BP Location FiO2 (%) Pain Score    05/31/25 1731 05/31/25 1731 05/31/25 1731 -- 05/31/25 1759    Oral Monitor Left arm  8      Vitals      Date and Time Temp Pulse SpO2 Resp BP Pain Score FACES Pain Rating User   05/31/25 1759 -- -- -- -- -- 8 -- DS   05/31/25 1731 98.1 °F (36.7 °C) 74 99 % 16 115/55 -- -- RK            Physical Exam  Constitutional:       Appearance: He is well-developed.   HENT:      Head: Normocephalic and atraumatic.     Eyes:      General: No scleral icterus.     Conjunctiva/sclera: Conjunctivae normal.     Neck:      Vascular: No JVD.      Trachea: No tracheal deviation.     Cardiovascular:      Rate and Rhythm: Normal rate and regular rhythm.   Pulmonary:      Effort: Pulmonary effort is normal. No respiratory distress.   Chest:      Chest wall: Tenderness present.   Abdominal:      General: There is no distension.     Musculoskeletal:         General: No deformity. Normal range of motion.      Cervical back: Normal range of motion.     Skin:     Coloration: Skin is not pale.      Findings: No erythema or rash.     Neurological:      Mental Status: He is alert and oriented to person, place, and time.     Psychiatric:         Behavior: Behavior normal.         Results Reviewed       None            No orders to display       ECG 12 Lead Documentation Only    Date/Time: 5/31/2025 6:01 PM    Performed by: Henry Mancera MD  Authorized by: Henry Mancera MD    ECG reviewed by me, the ED Provider: yes    Patient location:  ED  Rate:     ECG rate:  60    ECG rate assessment: normal    Rhythm:     Rhythm: sinus rhythm    Ectopy:     Ectopy: none    QRS:     QRS axis:  Normal    QRS intervals:  Normal  Conduction:     Conduction: normal    ST segments:     ST segments:  Normal  T waves:     T waves: normal        ED Medication and Procedure Management   Prior to Admission  Medications   Prescriptions Last Dose Informant Patient Reported? Taking?   SODIUM FLUORIDE, DENTAL GEL, 1.1 % CREA   No No   Sig: Brush teeth one time before bedtime, don't rinse but spit out excess.   ibuprofen (MOTRIN) 600 mg tablet   No No   Sig: Take 1 tablet (600 mg total) by mouth every 6 (six) hours as needed for mild pain, moderate pain, fever or headaches   lidocaine (LIDODERM) 5 %   No No   Sig: Apply 1 patch topically daily over 12 hours for 4 days Remove & Discard patch within 12 hours or as directed by MD   methocarbamol (ROBAXIN) 500 mg tablet   No No   Sig: Take 1 tablet (500 mg total) by mouth 2 (two) times a day      Facility-Administered Medications: None     Patient's Medications   Discharge Prescriptions    DICLOFENAC SODIUM (VOLTAREN) 1 %    Apply 2 g topically 4 (four) times a day       Start Date: 5/31/2025 End Date: --       Order Dose: 2 g       Quantity: 150 g    Refills: 0     No discharge procedures on file.  ED SEPSIS DOCUMENTATION   Time reflects when diagnosis was documented in both MDM as applicable and the Disposition within this note       Time User Action Codes Description Comment    5/31/2025  6:00 PM Henry Mancera Add [R07.89] Acute chest wall pain                    [1]   Past Medical History:  Diagnosis Date    Asthma     Complete blindness of right eye    [2] No past surgical history on file.  [3] No family history on file.  [4]   Social History  Tobacco Use    Smoking status: Never   Vaping Use    Vaping status: Every Day   Substance Use Topics    Alcohol use: Yes     Comment: occasionally    Drug use: Yes     Types: Marijuana        Henry Mancera MD  05/31/25 3425

## 2025-06-01 LAB
ATRIAL RATE: 60 BPM
P AXIS: 86 DEGREES
PR INTERVAL: 126 MS
QRS AXIS: 58 DEGREES
QRSD INTERVAL: 100 MS
QT INTERVAL: 396 MS
QTC INTERVAL: 396 MS
T WAVE AXIS: 53 DEGREES
VENTRICULAR RATE: 60 BPM

## 2025-06-01 PROCEDURE — 93010 ELECTROCARDIOGRAM REPORT: CPT | Performed by: INTERNAL MEDICINE

## 2025-06-17 ENCOUNTER — HOSPITAL ENCOUNTER (EMERGENCY)
Facility: HOSPITAL | Age: 25
Discharge: HOME | End: 2025-06-17
Attending: EMERGENCY MEDICINE | Admitting: EMERGENCY MEDICINE
Payer: COMMERCIAL

## 2025-06-17 VITALS
SYSTOLIC BLOOD PRESSURE: 114 MMHG | RESPIRATION RATE: 16 BRPM | TEMPERATURE: 98.3 F | BODY MASS INDEX: 24.25 KG/M2 | WEIGHT: 136.91 LBS | OXYGEN SATURATION: 100 % | DIASTOLIC BLOOD PRESSURE: 53 MMHG | HEART RATE: 57 BPM

## 2025-06-17 DIAGNOSIS — H65.191 ACUTE MIDDLE EAR EFFUSION, RIGHT: Primary | ICD-10-CM

## 2025-06-17 DIAGNOSIS — R51.9 ACUTE NONINTRACTABLE HEADACHE, UNSPECIFIED HEADACHE TYPE: ICD-10-CM

## 2025-06-17 PROCEDURE — 99282 EMERGENCY DEPT VISIT SF MDM: CPT

## 2025-06-17 PROCEDURE — 99284 EMERGENCY DEPT VISIT MOD MDM: CPT

## 2025-06-17 PROCEDURE — 96372 THER/PROPH/DIAG INJ SC/IM: CPT

## 2025-06-17 RX ORDER — FLUTICASONE PROPIONATE 50 MCG
1 SPRAY, SUSPENSION (ML) NASAL DAILY
Status: DISCONTINUED | OUTPATIENT
Start: 2025-06-18 | End: 2025-06-17

## 2025-06-17 RX ORDER — KETOROLAC TROMETHAMINE 30 MG/ML
15 INJECTION, SOLUTION INTRAMUSCULAR; INTRAVENOUS ONCE
Status: COMPLETED | OUTPATIENT
Start: 2025-06-17 | End: 2025-06-17

## 2025-06-17 RX ORDER — FLUTICASONE PROPIONATE 50 MCG
1 SPRAY, SUSPENSION (ML) NASAL DAILY
Status: DISCONTINUED | OUTPATIENT
Start: 2025-06-17 | End: 2025-06-17 | Stop reason: HOSPADM

## 2025-06-17 RX ADMIN — KETOROLAC TROMETHAMINE 15 MG: 30 INJECTION, SOLUTION INTRAMUSCULAR; INTRAVENOUS at 20:01

## 2025-06-17 RX ADMIN — FLUTICASONE PROPIONATE 1 SPRAY: 50 SPRAY, METERED NASAL at 20:00

## 2025-06-17 NOTE — ED PROVIDER NOTES
"Time reflects when diagnosis was documented in both MDM as applicable and the Disposition within this note       Time User Action Codes Description Comment    6/17/2025  7:54 PM Jennifer Hall [H65.191] Acute middle ear effusion, right     6/17/2025  7:54 PM Jennifer Hall [R51.9] Acute nonintractable headache, unspecified headache type           ED Disposition       ED Disposition   Discharge    Condition   Stable    Date/Time   Tue Jun 17, 2025  7:56 PM    Comment   Shin Lam discharge to home/self care.                   Assessment & Plan       Medical Decision Making  25-year-old male presenting to the emergency department for right sided ear pain and muffled hearing.  Reports that he was at the beach recently and believes that he got water in his ear.  States that he feels that he got all of the water out but since then has developed this pain that is causing a generalized headache.  Reports that shortly after that he developed nasal congestion.  Denies tinnitus. Denies fevers.     Physical exam suggestive of middle ear effusion.  Likely secondary to eustachian tube dysfunction. Outer ear, EAC normal.   Will prescribe Flonase, recommend PCP follow-up.  Provided contact for ENT as needed.  Offered COVID/flu testing, patient deferred.    Discussed findings from the visit with the patient.  We had a conversation regarding supportive care and indications for return.  Recommended appropriate follow-up.  Patient and/or family understand and agree with plan.    Portions of the record may have been created with voice recognition software. Occasional use of the incorrect word or \"sound a like\" substitutions may have occurred due to the inherent limitations of voice recognition software. Read the chart carefully and recognize, using context, where substitutions have occurred.           Risk  Prescription drug management.             Medications   fluticasone (FLONASE) 50 mcg/act nasal spray 1 spray (1 " "spray Each Nare Given 6/17/25 2000)   ketorolac (TORADOL) injection 15 mg (15 mg Intramuscular Given 6/17/25 2001)       ED Risk Strat Scores                    No data recorded                            History of Present Illness       Chief Complaint   Patient presents with    Earache     Pt reports \"right ear pain and unable to hear since yesterday\" no meds pta.        Past Medical History[1]   Past Surgical History[2]   Family History[3]   Social History[4]   E-Cigarette/Vaping    E-Cigarette Use Current Every Day User       E-Cigarette/Vaping Substances      I have reviewed and agree with the history as documented.     Patient presents with:  Earache: Pt reports \"right ear pain and unable to hear since yesterday\" no meds pta.           Earache  Location:  Right  Behind ear:  No abnormality  Quality:  Aching  Severity:  Moderate  Onset quality:  Gradual  Duration:  2 days  Progression:  Unchanged  Chronicity:  New  Context: water in ear    Relieved by:  Nothing  Ineffective treatments:  None tried  Associated symptoms: congestion and headaches    Associated symptoms: no abdominal pain, no cough, no diarrhea, no ear discharge, no fever, no neck pain, no rash, no rhinorrhea, no sore throat, no tinnitus and no vomiting    Risk factors: no prior ear surgery        Review of Systems   Constitutional:  Negative for fever.   HENT:  Positive for congestion and ear pain. Negative for ear discharge, rhinorrhea, sore throat and tinnitus.    Eyes:  Negative for pain and itching.   Respiratory:  Negative for cough.    Gastrointestinal:  Negative for abdominal pain, diarrhea and vomiting.   Musculoskeletal:  Negative for neck pain.   Skin:  Negative for rash.   Neurological:  Positive for headaches.           Objective       ED Triage Vitals [06/17/25 1855]   Temperature Pulse Blood Pressure Respirations SpO2 Patient Position - Orthostatic VS   98.3 °F (36.8 °C) 57 114/53 16 100 % Sitting      Temp Source Heart Rate Source " BP Location FiO2 (%) Pain Score    Oral Monitor Right arm -- 8      Vitals      Date and Time Temp Pulse SpO2 Resp BP Pain Score FACES Pain Rating User   06/17/25 2001 -- -- -- -- -- 7 -- TP   06/17/25 1855 98.3 °F (36.8 °C) 57 100 % 16 114/53 8 -- NM            Physical Exam  Vitals and nursing note reviewed.   Constitutional:       General: He is not in acute distress.     Appearance: He is well-developed. He is not ill-appearing.   HENT:      Head: Normocephalic and atraumatic.      Right Ear: Hearing, ear canal and external ear normal. A middle ear effusion is present. No mastoid tenderness. Tympanic membrane is not erythematous or bulging.      Left Ear: Hearing, tympanic membrane, ear canal and external ear normal.      Mouth/Throat:      Mouth: Mucous membranes are moist. No oral lesions.      Pharynx: Uvula midline. No oropharyngeal exudate, posterior oropharyngeal erythema or uvula swelling.      Tonsils: No tonsillar exudate or tonsillar abscesses. 2+ on the right. 2+ on the left.     Eyes:      Conjunctiva/sclera: Conjunctivae normal.       Cardiovascular:      Rate and Rhythm: Normal rate and regular rhythm.      Pulses: Normal pulses.   Pulmonary:      Effort: Pulmonary effort is normal. No respiratory distress.      Breath sounds: Normal breath sounds.   Abdominal:      Palpations: Abdomen is soft.      Tenderness: There is no abdominal tenderness.     Musculoskeletal:         General: No swelling.      Cervical back: Neck supple.     Skin:     General: Skin is warm and dry.      Capillary Refill: Capillary refill takes less than 2 seconds.     Neurological:      General: No focal deficit present.      Mental Status: He is alert and oriented to person, place, and time.     Psychiatric:         Mood and Affect: Mood normal.         Results Reviewed       None            No orders to display       Procedures    ED Medication and Procedure Management   Prior to Admission Medications   Prescriptions Last  Dose Informant Patient Reported? Taking?   Diclofenac Sodium (VOLTAREN) 1 %   No No   Sig: Apply 2 g topically 4 (four) times a day   SODIUM FLUORIDE, DENTAL GEL, 1.1 % CREA   No No   Sig: Brush teeth one time before bedtime, don't rinse but spit out excess.   lidocaine (LIDODERM) 5 %   No No   Sig: Apply 1 patch topically daily over 12 hours for 4 days Remove & Discard patch within 12 hours or as directed by MD   methocarbamol (ROBAXIN) 500 mg tablet   No No   Sig: Take 1 tablet (500 mg total) by mouth 2 (two) times a day      Facility-Administered Medications: None     Discharge Medication List as of 6/17/2025  7:56 PM        CONTINUE these medications which have NOT CHANGED    Details   Diclofenac Sodium (VOLTAREN) 1 % Apply 2 g topically 4 (four) times a day, Starting Sat 5/31/2025, Normal      lidocaine (LIDODERM) 5 % Apply 1 patch topically daily over 12 hours for 4 days Remove & Discard patch within 12 hours or as directed by MD, Starting u 5/29/2025, Until Mon 6/2/2025, Normal      methocarbamol (ROBAXIN) 500 mg tablet Take 1 tablet (500 mg total) by mouth 2 (two) times a day, Starting Thu 5/29/2025, Normal      SODIUM FLUORIDE, DENTAL GEL, 1.1 % CREA Brush teeth one time before bedtime, don't rinse but spit out excess., Normal           No discharge procedures on file.  ED SEPSIS DOCUMENTATION   Time reflects when diagnosis was documented in both MDM as applicable and the Disposition within this note       Time User Action Codes Description Comment    6/17/2025  7:54 PM Jennifer Hall [H65.191] Acute middle ear effusion, right     6/17/2025  7:54 PM Jennifer Hall [R51.9] Acute nonintractable headache, unspecified headache type                    [1]   Past Medical History:  Diagnosis Date    Asthma     Complete blindness of right eye    [2] No past surgical history on file.  [3] No family history on file.  [4]   Social History  Tobacco Use    Smoking status: Never   Vaping Use    Vaping  status: Every Day   Substance Use Topics    Alcohol use: Yes     Comment: occasionally    Drug use: Yes     Types: Marijuana        Jennifer Hall PA-C  06/17/25 [id number]

## 2025-06-17 NOTE — Clinical Note
Shin Lam was seen and treated in our emergency department on 6/17/2025.                Diagnosis:     Shin  may return to work on return date.    He may return on this date: 06/20/2025         If you have any questions or concerns, please don't hesitate to call.      Jennifer Hall PA-C    ______________________________           _______________          _______________  Hospital Representative                              Date                                Time

## 2025-06-17 NOTE — DISCHARGE INSTRUCTIONS
Use Flonase 1-2x daily.   Use Tylenol/Motrin as needed for fever, pain relief.    Encourage hydration and rest.  Practice good hand hygiene.   Monitor for worsening symptoms.  Follow-up with primary care in 1 week.

## 2025-07-12 ENCOUNTER — HOSPITAL ENCOUNTER (EMERGENCY)
Facility: HOSPITAL | Age: 25
Discharge: HOME | End: 2025-07-12
Attending: EMERGENCY MEDICINE | Admitting: EMERGENCY MEDICINE
Payer: COMMERCIAL

## 2025-07-12 VITALS
OXYGEN SATURATION: 100 % | DIASTOLIC BLOOD PRESSURE: 59 MMHG | SYSTOLIC BLOOD PRESSURE: 139 MMHG | WEIGHT: 132.94 LBS | BODY MASS INDEX: 23.55 KG/M2 | HEIGHT: 63 IN | HEART RATE: 78 BPM | RESPIRATION RATE: 16 BRPM | TEMPERATURE: 98.8 F

## 2025-07-12 DIAGNOSIS — L73.9 FOLLICULITIS: Primary | ICD-10-CM

## 2025-07-12 PROCEDURE — 99284 EMERGENCY DEPT VISIT MOD MDM: CPT

## 2025-07-12 RX ORDER — KETOROLAC TROMETHAMINE 30 MG/ML
15 INJECTION, SOLUTION INTRAMUSCULAR; INTRAVENOUS ONCE
Status: DISCONTINUED | OUTPATIENT
Start: 2025-07-12 | End: 2025-07-12

## 2025-07-12 RX ORDER — IBUPROFEN 600 MG/1
600 TABLET, FILM COATED ORAL EVERY 6 HOURS PRN
Qty: 30 TABLET | Refills: 0 | Status: SHIPPED | OUTPATIENT
Start: 2025-07-12

## 2025-07-12 RX ORDER — IBUPROFEN 600 MG/1
600 TABLET, FILM COATED ORAL ONCE
Status: COMPLETED | OUTPATIENT
Start: 2025-07-12 | End: 2025-07-12

## 2025-07-12 RX ORDER — CEPHALEXIN 500 MG/1
500 CAPSULE ORAL EVERY 6 HOURS SCHEDULED
Qty: 20 CAPSULE | Refills: 0 | Status: SHIPPED | OUTPATIENT
Start: 2025-07-12 | End: 2025-07-17

## 2025-07-12 RX ADMIN — IBUPROFEN 600 MG: 600 TABLET, FILM COATED ORAL at 20:41

## 2025-07-12 RX ADMIN — CEPHALEXIN 500 MG: 250 CAPSULE ORAL at 20:41

## 2025-07-12 NOTE — Clinical Note
Shin Lam was seen and treated in our emergency department on 7/12/2025.                Diagnosis:     Shin  may return to work on return date.    He may return on this date: 07/13/2025         If you have any questions or concerns, please don't hesitate to call.      Eddie Berrios PA-C    ______________________________           _______________          _______________  Hospital Representative                              Date                                Time

## 2025-07-13 NOTE — DISCHARGE INSTRUCTIONS
Today I provided prescription for Keflex.  Take as directed in the coming days.  Apply warm compresses to the area of redness, swelling, pain.  Apply the warm compress for 20 minutes at a time every 4 hours in the coming days.  Keep area clean, dry.  Wash daily with soap and water.  Return to ED for new or worsening symptoms despite antibiotic.

## 2025-07-13 NOTE — ED PROVIDER NOTES
"Time reflects when diagnosis was documented in both MDM as applicable and the Disposition within this note       Time User Action Codes Description Comment    7/12/2025  8:29 PM Eddie Berrios Add [L73.9] Folliculitis           ED Disposition       ED Disposition   Discharge    Condition   Stable    Date/Time   Sat Jul 12, 2025  8:29 PM    Comment   Shin Lam discharge to home/self care.                   Assessment & Plan       Medical Decision Making  25-year-old male presents to ED for evaluation of ingrown pubic hair as seen in HPI.  On physical examination patient vital signs stable.  Afebrile.  Nontachycardic.  Chaperone present during examination of  region.  Small erythematous, indurated, circular lesion of the left upper inguinal region.  No expressible pus, drainage.  Lesion consistent with acutely inflamed hair follicle.  Do not suspect large amounts of pus would be expressed with incision and drainage.  Only 1 lesion seen.  Unremarkable examination of penis, testes, scrotum.  Has history of the same.  Will treat with Keflex, warm compresses.  Advised to monitor site and return to ED for new or worsening symptoms despite treatment.  Follow-up with primary care doctor as needed.  Patient agreeable to plan.  Patient discharged.     Prior to discharge, discharge instructions were discussed with patient at bedside. Patient was provided both verbal and written instructions. Patient is understanding of the discharge instructions and is agreeable to plan of care. Return precautions were discussed with patient bedside, patient verbalized understanding of signs and symptoms that would necessitate return to the ED. All questions were answered. Patient was comfortable with the plan of care and discharged to home.    Portions of this chart may have been written with voice recognition software.  Occasional grammatical errors, wrong word or \"sound a like\" substitutions may have occurred due to software " limitations.  Please read carefully and use context to recognize where substitutions have occurred.       Risk  Prescription drug management.             Medications   cephalexin (KEFLEX) capsule 500 mg (500 mg Oral Given 7/12/25 2041)   ibuprofen (MOTRIN) tablet 600 mg (600 mg Oral Given 7/12/25 2041)       ED Risk Strat Scores                    No data recorded        SBIRT 22yo+      Flowsheet Row Most Recent Value   Initial Alcohol Screen: US AUDIT-C     1. How often do you have a drink containing alcohol? 0 Filed at: 07/12/2025 2011   2. How many drinks containing alcohol do you have on a typical day you are drinking?  0 Filed at: 07/12/2025 2011   3a. Male UNDER 65: How often do you have five or more drinks on one occasion? 0 Filed at: 07/12/2025 2011   3b. FEMALE Any Age, or MALE 65+: How often do you have 4 or more drinks on one occassion? 0 Filed at: 07/12/2025 2011   Audit-C Score 0 Filed at: 07/12/2025 2011   NYDIA: How many times in the past year have you...    Used an illegal drug or used a prescription medication for non-medical reasons? Never Filed at: 07/12/2025 2011                            History of Present Illness       Chief Complaint   Patient presents with    Testicle Pain     Patient reports 2 days of burning pain in the left testicle. No dysuria, hematuria. Reports taking ibuprofen approx 1600.        Past Medical History[1]   Past Surgical History[2]   Family History[3]   Social History[4]   E-Cigarette/Vaping    E-Cigarette Use Current Every Day User       E-Cigarette/Vaping Substances      I have reviewed and agree with the history as documented.     25-year-old male presents to ED for evaluation of ingrown pubic hair.  Patient explains that the past 2 days he has been having pain due to a suspected ingrown pubic hair.  The pain is located in the left proximal aspect of inguinal region.  Of note, the triage note says testicle pain however the pain is not in the testicle or scrotum.   Patient has history of similar symptoms.  He was seen in the ED on August 26, 2024 for ingrown pubic hair in the same location.  Was treated successfully with Keflex, warm compresses.  Denies fever, chills, shortness of breath, chest pain, dysuria, penile discharge.         Review of Systems   Skin:         Positive ingrown pubic hair   All other systems reviewed and are negative.          Objective       ED Triage Vitals [07/12/25 2012]   Temperature Pulse Blood Pressure Respirations SpO2 Patient Position - Orthostatic VS   98.8 °F (37.1 °C) 78 139/59 16 100 % Sitting      Temp Source Heart Rate Source BP Location FiO2 (%) Pain Score    Oral Monitor Right arm -- 10 - Worst Possible Pain      Vitals      Date and Time Temp Pulse SpO2 Resp BP Pain Score FACES Pain Rating User   07/12/25 2012 98.8 °F (37.1 °C) 78 100 % 16 139/59 10 - Worst Possible Pain -- DS            Physical Exam  Vitals and nursing note reviewed. Exam conducted with a chaperone present.   Constitutional:       General: He is not in acute distress.     Appearance: Normal appearance. He is well-developed. He is not toxic-appearing.   HENT:      Head: Normocephalic and atraumatic.     Eyes:      Conjunctiva/sclera: Conjunctivae normal.       Cardiovascular:      Rate and Rhythm: Normal rate and regular rhythm.      Heart sounds: No murmur heard.  Pulmonary:      Effort: Pulmonary effort is normal. No respiratory distress.      Breath sounds: Normal breath sounds.   Abdominal:      Palpations: Abdomen is soft.      Tenderness: There is no abdominal tenderness.   Genitourinary:     Penis: Normal.       Testes: Normal. Cremasteric reflex is present.        Comments: Small erythematous, indurated, circular lesion of the left upper inguinal region.  No expressible pus, drainage.    Musculoskeletal:      Cervical back: Neck supple.     Skin:     General: Skin is warm and dry.      Capillary Refill: Capillary refill takes less than 2 seconds.      Neurological:      Mental Status: He is alert.     Psychiatric:         Mood and Affect: Mood normal.         Results Reviewed       None            No orders to display       Procedures    ED Medication and Procedure Management   Prior to Admission Medications   Prescriptions Last Dose Informant Patient Reported? Taking?   Diclofenac Sodium (VOLTAREN) 1 %   No No   Sig: Apply 2 g topically 4 (four) times a day   SODIUM FLUORIDE, DENTAL GEL, 1.1 % CREA   No No   Sig: Brush teeth one time before bedtime, don't rinse but spit out excess.   lidocaine (LIDODERM) 5 %   No No   Sig: Apply 1 patch topically daily over 12 hours for 4 days Remove & Discard patch within 12 hours or as directed by MD   methocarbamol (ROBAXIN) 500 mg tablet   No No   Sig: Take 1 tablet (500 mg total) by mouth 2 (two) times a day      Facility-Administered Medications: None     Discharge Medication List as of 7/12/2025  8:31 PM        START taking these medications    Details   cephalexin (KEFLEX) 500 mg capsule Take 1 capsule (500 mg total) by mouth every 6 (six) hours for 5 days, Starting Sat 7/12/2025, Until Thu 7/17/2025, Normal      ibuprofen (MOTRIN) 600 mg tablet Take 1 tablet (600 mg total) by mouth every 6 (six) hours as needed for mild pain, Starting Sat 7/12/2025, Normal           CONTINUE these medications which have NOT CHANGED    Details   Diclofenac Sodium (VOLTAREN) 1 % Apply 2 g topically 4 (four) times a day, Starting Sat 5/31/2025, Normal      lidocaine (LIDODERM) 5 % Apply 1 patch topically daily over 12 hours for 4 days Remove & Discard patch within 12 hours or as directed by MD, Starting Thu 5/29/2025, Until Mon 6/2/2025, Normal      methocarbamol (ROBAXIN) 500 mg tablet Take 1 tablet (500 mg total) by mouth 2 (two) times a day, Starting Thu 5/29/2025, Normal      SODIUM FLUORIDE, DENTAL GEL, 1.1 % CREA Brush teeth one time before bedtime, don't rinse but spit out excess., Normal           No discharge procedures on  file.  ED SEPSIS DOCUMENTATION   Time reflects when diagnosis was documented in both MDM as applicable and the Disposition within this note       Time User Action Codes Description Comment    7/12/2025  8:29 PM Eddie Berrios Add [L73.9] Folliculitis                      [1]   Past Medical History:  Diagnosis Date    Asthma     Complete blindness of right eye    [2] No past surgical history on file.  [3] No family history on file.  [4]   Social History  Tobacco Use    Smoking status: Never   Vaping Use    Vaping status: Every Day   Substance Use Topics    Alcohol use: Yes     Comment: occasionally    Drug use: Yes     Types: Marijuana        Eddie Berrios PA-C  07/13/25 0455